# Patient Record
Sex: MALE | Race: OTHER | HISPANIC OR LATINO | Employment: FULL TIME | URBAN - METROPOLITAN AREA
[De-identification: names, ages, dates, MRNs, and addresses within clinical notes are randomized per-mention and may not be internally consistent; named-entity substitution may affect disease eponyms.]

---

## 2018-07-01 ENCOUNTER — HOSPITAL ENCOUNTER (EMERGENCY)
Facility: HOSPITAL | Age: 33
Discharge: HOME | End: 2018-07-01
Attending: EMERGENCY MEDICINE | Admitting: EMERGENCY MEDICINE
Payer: COMMERCIAL

## 2018-07-01 VITALS
OXYGEN SATURATION: 97 % | WEIGHT: 160 LBS | DIASTOLIC BLOOD PRESSURE: 71 MMHG | TEMPERATURE: 97.8 F | HEART RATE: 89 BPM | RESPIRATION RATE: 16 BRPM | SYSTOLIC BLOOD PRESSURE: 119 MMHG

## 2018-07-01 DIAGNOSIS — S16.1XXA STRAIN OF NECK MUSCLE, INITIAL ENCOUNTER: Primary | ICD-10-CM

## 2018-07-01 DIAGNOSIS — S39.012A STRAIN OF LUMBAR PARASPINOUS MUSCLE, INITIAL ENCOUNTER: ICD-10-CM

## 2018-07-01 PROCEDURE — 99283 EMERGENCY DEPT VISIT LOW MDM: CPT

## 2018-07-01 RX ORDER — BACLOFEN 10 MG/1
10 TABLET ORAL 3 TIMES DAILY
Qty: 15 TABLET | Refills: 0 | Status: SHIPPED | OUTPATIENT
Start: 2018-07-01 | End: 2019-04-19

## 2018-07-01 RX ORDER — ACETAMINOPHEN 325 MG/1
650 TABLET ORAL ONCE
Status: COMPLETED | OUTPATIENT
Start: 2018-07-01 | End: 2018-07-01

## 2018-07-01 RX ORDER — IBUPROFEN 600 MG/1
600 TABLET ORAL ONCE
Status: COMPLETED | OUTPATIENT
Start: 2018-07-01 | End: 2018-07-01

## 2018-07-01 RX ADMIN — ACETAMINOPHEN 650 MG: 325 TABLET, FILM COATED ORAL at 17:17

## 2018-07-01 RX ADMIN — IBUPROFEN 600 MG: 600 TABLET ORAL at 17:17
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
## 2018-07-01 NOTE — DISCHARGE INSTRUCTIONS
Distensión cervical   LO QUE NECESITA SABER:   Leonard distensión cervical es un estiramiento o desgarro de un músculo o ligamento del mojgan  Los tendones son los tejidos ramona que [place] a los músculos con los [place]  Las causas comunes de las distensiones cervicales incluyen un accidente automovilístico, leonard caída o leonard lesión deportiva  INSTRUCCIONES SOBRE EL LANI HOSPITALARIA:   Regrese a la jonnathan de emergencias si:   · Usted tiene dolor o entumecimiento de roberto hombros a catherine mano  · Usted tiene problemas de visión, audición o equilibrio  · Usted se siente confundido o no puede concentrarse  · Tiene problemas con el movimiento y fuerza  Pregúntele a catherine Parvin Clayman vitaminas y minerales son adecuados para usted  · Usted tiene más inflamación o dolor en catherine mojgan  · Usted tiene preguntas o inquietudes acerca de catherine condición o cuidado  Medicamentos:  Es posible que usted necesite alguno de los siguientes:  · Acetaminofeno:  parag el dolor y baja la fiebre  Está disponible sin receta médica  Pregunte la cantidad y la frecuencia con que debe tomarlos  [address]  Beth las etiquetas de todos los demás medicamentos que esté usando para saber si también contienen acetaminofén, o pregunte a catherine médico o farmacéutico  El acetaminofén puede causar daño en el hígado cuando no se abbey de forma correcta  No use más de 4 gramos (4000 miligramos) en total de acetaminofeno en un día  · AINEs (Analgésicos antiinflamatorios no esteroides) oh el ibuprofeno, ayudan a disminuir la inflamación, el dolor y la [place]  Alistair medicamento esta disponible con o sin leonard receta médica  Los AINEs pueden causar sangrado estomacal o problemas renales en ciertas personas  Si usted abbey un medicamento anticoagulante, siempre pregúntele a catherine médico si los SOFÍA son seguros para usted  Siempre beth la etiqueta de alistair medicamento y [place] instrucciones      · Relajantes musculares  ayudan a reducir dolor y espasmos musculares  · Un medicamento con receta para el dolor  podrían ser Gerhardt Estefania  Pregunte al médico cómo debe eduardo alistair medicamento de forma reynolds  Algunos medicamentos recetados para el dolor contienen acetaminofén  No tome otros medicamentos que contengan acetaminofén sin consultarlo con catherine médico  Demasiado acetaminofeno puede causar daño al hígado  Los medicamentos recetados para el dolor podrían causar estreñimiento  Pregunte a catherine médico oh prevenir o tratar estreñimiento  · [place] roberto medicamentos oh se le haya indicado  Consulte con catherine médico si usted elías que catherine medicamento no le está ayudando o si presenta efectos secundarios  Infórmele si es alérgico a cualquier medicamento  Mantenga leonard lista actualizada de los [place], las vitaminas y los productos herbales que abbey  Incluya los siguientes datos de los medicamentos: cantidad, frecuencia y motivo de administración  Traiga con usted la lista o los envases de la píldoras a roberto citas de seguimiento  Lleve la lista de los medicamentos con usted en [place] de leonard emergencia  El manejo de catherine síntomas:   · La aplicación de calor  en el mojgan chyna 15 a 20 minutos, 4 a 6 veces por día o según lo indicado  El calor ayuda a disminuir el dolor, la rigidez y los espasmos musculares  · Comience con ejercicios suaves para catherine mojgan  tan pronto oh usted pueda  catherine mojgan sin sentir dolor  Los ejercicios le ayudarán a disminuir la rigidez y a mejorar la fuerza y el rango de movimiento de catherine mojgan  Pregunte a catherine médico qué tipo de ejercicios debe hacer  · Regrese a roberto actividades usuales oh se le indique  Suspéndalas si siente dolor  Evite las [facility] [place] pueden provocar mas daño al mojgan, oh levantar objetos pesados o realizar ejercicio extenuante o de antonio intensidad  · Duerma sin almohada  para ayudar a reducir el dolor  En catherine lugar, enrolle leonard toalla pequeña guilherme apretada y colóquela bajo catherine mojgan       · Roylene Boy a terapia física según indicaciones  Un fisioterapeuta le puede enseñar ejercicios para ayudarle a mejorar el movimiento y la fuerza, y para disminuir el dolor  Evite leonard lesión en el mojgan:   · Conduzca con seguridad  Asegúrese que todos en el joaquín usan el cinturón de seguridad  Un cinturón de seguridad puede salvar brush shannan en philippe de un accidente automovilístico  No use el celular cuando esté manejando  [place] puede hacer que se distraiga y causar un accidente  Es mejor que pare y se orille si necesita hacer leonard Jake Nam un texto  · Use un thelma, un chaleco salvavidas y unos implementos de protección  Siempre use un thelma al [employer] en bicicleta o motocicleta, esquiar o jugar deportes que podrían causar leonard lesión en la kamila  Use implementos de protección cuando practique deportes  Use un chaleco salvavidas cuando esté en un bote o practicando actividades acuáticas  Acuda a roberto consultas de control con brush médico según le indicaron  Puede ser Latesha Chambers a un ortopedista o a fisioterapia  Anote roberto preguntas para que se acuerde de hacerlas chyna roberto visitas  © 2017 [address]  Information is for End User's use only and may not be sold, redistributed or otherwise used for commercial purposes  All illustrations and images included in CareNotes® are the copyrighted property of A D A M , Inc  or Dom Cartagena  Esta información es sólo para uso en educación  Brush intención no es darle un consejo médico sobre enfermedades o tratamientos  Colsulte con brush Megan Palomo farmacéutico antes de seguir cualquier régimen médico para saber si es seguro y efectivo para usted  Distensión de la parte inferior de la espalda, cuidados ambulatorios   INFORMACIÓN GENERAL:   La distensión de la parte inferior de la espalda  es leonard lesión en los músculos o tendones de la parte inferior de brush espalda o la región lumbar  Los tendones son los tejidos ramona que conectan a los Fatmata Scherer inferior de la espalda sostiene la mayor parte de catherine peso corporal y facilita catherine habilidad de moverse, torcerse y de inclinarse  El desgarro lumbar por lo general es causado por actividades que aumentan la tensión en la parte inferior de la espalda oh el ejercicio o leonard lesión  Los siguientes son los síntomas más comunes:   · Dolor en la parte inferior de la espalda o espasmos musculares    · Rigidez o movimiento limitado    · Dolor que se desplaza hacia los glúteos, yamileth o las piernas  · Dolor que empeora con la actividad  Busque atención médica inmediata al presentar los siguientes síntomas:   · Un chasquido en la región lumbar    · Aumento del dolor o la inflamación en la región lumbar    · Dificultad para  roberto piernas    · Entumecimiento en roberto piernas  El tratamiento para la distensión lumbar:   · Los antiiflamatorios no esteroideos (AINEs) ayudan a reducir inflamación y dolor o fiebre  [place] medicamento esta disponible con o sin leonard receta médica  Los AINEs podrían causar sangrado estomacal o problemas en los riñones en [place]  Si usted esta tomando un anticoágulante, siempre  pregunte a catherine proveedor de olya si los AINEs son seguros para usted  Antes de [employer] [place], new siempre la etiqueta de información y siga roberto indicaciones  · Los relajantes musculares  ayudan a disminuir el dolor de los espasmos musculares  · Le podrían recetar medicamento para el dolor  Pregunte cuál es la forma reynolds para eduardo el medicamento  Lidiar con roberto síntomas:   · Guarde reposo  en cama después de catherine Judieth Raven  Poco a poco empiece a incrementar catherine actividad física a medida que el dolor disminuya o según le recomendaron  · Aplique hielo  en la parte inferior de catherine espalda por 15 a 20 minutos cada hora o oh sea indicado  Use leonard compresa de hielo o coloque hielo tony en leonard bolsa de plástico y cúbrala con leonard toalla   El hielo ayuda a prevenir daño al tejido y [employer] inflamación y el dolor  Usted puede alternar entre el hielo y calor  · Aplique leonard compresa caliente  [employer] parte inferior de catherine espalda por 20 a 30 minutos cada 2 horas por los días que le recomendaron  El calor ayuda a disminuir el dolor y los espasmos musculares  Prevenir otra distensión lumbar:   · Use movimiento corporal correcto:      ¨ Cuando levante objetos pesados, debe inclinarse por la cadera y doblar las rodillas  No se incline o doble por la cintura  Utilice los [employer] de las piernas mientras levanta la carga  No use catherine espalda  Mantenga el objeto cerca de catherine pecho mientras lo levanta  No se tuerza, ni levante cualquier cosa por encima de catherine cintura  ¨ Cambie catherine posición con frecuencia cuando pase mucho tiempo de pie  Descanse un pie sobre leonard Yuly Alderman o un reposapiés alterne el reposo con el otro pie frecuentemente  ¨ No permanezca sentado por lapsos de tiempo prolongados  Cuando es necesario [place], siéntese en leonard silla de respaldo recto con los pies apoyados en el suelo  Nunca trate de alcanzar, jalar o empujar mientras esté sentado  · Ejercicio de forma regular  Realice ejercicios de calentamiento antes de practicar cualquier ejercicio o deporte  Quyen ejercicios para fortalecer roberto músculos de la espalda  Pregúntele acerca del plan de ejercicio más adecuado para usted  · Mantenga un peso saludable  Pregúntele a catherine proveedor de olya cuál es [address] ideal para usted  Solicite que le ayude a crear un plan para adelgazar si usted tiene sobrepeso  Programe leonard luis a con catherine proveedor de [employer] se le haya indicado: Anote roberto preguntas para que se acuerde de hacerlas chyna roberto visitas  ACUERDOS SOBRE CATHERINE CUIDADO:   Usted tiene el derecho de participar en la planificación de catherine cuidado  Aprenda todo lo que pueda sobre catherine condición y oh darle tratamiento  Discuta con roberto médicos roberto opciones de tratamiento para juntos decidir el cuidado que ted quiere recibir   Usted siempre tiene Venkatesh Heart a rechazar catherine tratamiento  Esta información es sólo para uso en educación  Catherine intención no es darle un consejo médico sobre enfermedades o tratamientos  Colsulte con catherine Carollee Lovings farmacéutico antes de seguir cualquier régimen médico para saber si es seguro y efectivo para usted  © 2014 [address] is for End User's use only and may not be sold, redistributed or otherwise used for commercial purposes  All illustrations and images included in CareNotes® are the copyrighted property of A D A SYLVIA , Inc  or Dom Cartagena

## 2018-09-13 ENCOUNTER — APPOINTMENT (OUTPATIENT)
Dept: RADIOLOGY | Facility: CLINIC | Age: 33
End: 2018-09-13
Payer: COMMERCIAL

## 2018-09-13 VITALS
HEART RATE: 73 BPM | WEIGHT: 153.8 LBS | HEIGHT: 74 IN | DIASTOLIC BLOOD PRESSURE: 80 MMHG | SYSTOLIC BLOOD PRESSURE: 113 MMHG | BODY MASS INDEX: 19.74 KG/M2

## 2018-09-13 DIAGNOSIS — M41.35 THORACOGENIC SCOLIOSIS OF THORACOLUMBAR REGION: ICD-10-CM

## 2018-09-13 DIAGNOSIS — G89.29 CHRONIC NECK PAIN: ICD-10-CM

## 2018-09-13 DIAGNOSIS — G89.29 CHRONIC MIDLINE LOW BACK PAIN WITHOUT SCIATICA: Primary | ICD-10-CM

## 2018-09-13 DIAGNOSIS — M54.50 CHRONIC MIDLINE LOW BACK PAIN WITHOUT SCIATICA: Primary | ICD-10-CM

## 2018-09-13 DIAGNOSIS — M54.5 LOW BACK PAIN, UNSPECIFIED BACK PAIN LATERALITY, UNSPECIFIED CHRONICITY, WITH SCIATICA PRESENCE UNSPECIFIED: ICD-10-CM

## 2018-09-13 DIAGNOSIS — M54.2 CHRONIC NECK PAIN: ICD-10-CM

## 2018-09-13 PROCEDURE — 72082 X-RAY EXAM ENTIRE SPI 2/3 VW: CPT

## 2018-09-13 PROCEDURE — 99203 OFFICE O/P NEW LOW 30 MIN: CPT | Performed by: EMERGENCY MEDICINE

## 2018-09-13 RX ORDER — PREDNISONE 20 MG/1
TABLET ORAL
Qty: 18 TABLET | Refills: 0 | Status: SHIPPED | OUTPATIENT
Start: 2018-09-13 | End: 2019-04-19

## 2018-09-13 NOTE — PATIENT INSTRUCTIONS
While taking oral steroids (Prednisone, Medrol dose pack) do not take any NSAIDs such as Advil, ibuprofen, Motrin, Aleve or naproxen  You can restart the NSAIDs after you finish the steroids  While taking oral steroids, you may experience mild side effects such as feeling jittery or flushing  Please call if your side effects are significant or you have any questions
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
## 2018-09-17 NOTE — TELEPHONE ENCOUNTER
Anthony Hargrove  [phone]    Dr Amena Mitchell    Patient request Rx faxed to Manda Sommers on file     [phone]    predniSONE 20 mg tablet [[id number]]   Order Details   Dose, Route, Frequency: As Directed    Dispense Quantity:  18 tablet Refills:  0 Fills remaining:  --           Si tab PO TID x 3 days, then 1 tab PO BID x 3 days, then 1 tab PO QD x 3 days          Written Date:  18 Expiration Date:  19     Start Date:  18 End Date:  --            Ordering Provider:  -- CHUCK #:  -- NPI:  --    Authorizing Provider:  Annamarie Chapman MD CHUCK #:  [id number] NPI:  [id number]    Ordering User:  Annamarie Chapman MD            Diagnosis Association: Chronic midline low back pain without sciatica (M54 5 , G89 29)      Pharmacy Comments:  --

## 2018-10-25 VITALS
DIASTOLIC BLOOD PRESSURE: 69 MMHG | HEIGHT: 74 IN | WEIGHT: 154.6 LBS | SYSTOLIC BLOOD PRESSURE: 101 MMHG | HEART RATE: 80 BPM | BODY MASS INDEX: 19.84 KG/M2

## 2018-10-25 DIAGNOSIS — M54.50 CHRONIC MIDLINE LOW BACK PAIN WITHOUT SCIATICA: Primary | ICD-10-CM

## 2018-10-25 DIAGNOSIS — G89.29 CHRONIC MIDLINE LOW BACK PAIN WITHOUT SCIATICA: Primary | ICD-10-CM

## 2018-10-25 DIAGNOSIS — M41.35 THORACOGENIC SCOLIOSIS OF THORACOLUMBAR REGION: ICD-10-CM

## 2018-10-25 PROCEDURE — 99213 OFFICE O/P EST LOW 20 MIN: CPT | Performed by: EMERGENCY MEDICINE

## 2018-10-25 NOTE — PATIENT INSTRUCTIONS
Motrin or Advil or Ibuprofen 400-600mg every 6 hours as needed for pain and inflammation  OR Aleve 250-500mg every 12 hours  Ice and heat  Over the counter lidocaine patches
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
## 2019-01-22 ENCOUNTER — HOSPITAL ENCOUNTER (EMERGENCY)
Facility: HOSPITAL | Age: 34
Discharge: HOME | End: 2019-01-22
Attending: EMERGENCY MEDICINE | Admitting: EMERGENCY MEDICINE
Payer: COMMERCIAL

## 2019-01-22 ENCOUNTER — APPOINTMENT (EMERGENCY)
Dept: RADIOLOGY | Facility: HOSPITAL | Age: 34
End: 2019-01-22
Payer: COMMERCIAL

## 2019-01-22 VITALS
HEART RATE: 94 BPM | RESPIRATION RATE: 18 BRPM | WEIGHT: 155 LBS | DIASTOLIC BLOOD PRESSURE: 65 MMHG | TEMPERATURE: 97.3 F | BODY MASS INDEX: 19.9 KG/M2 | SYSTOLIC BLOOD PRESSURE: 117 MMHG | OXYGEN SATURATION: 99 %

## 2019-01-22 DIAGNOSIS — M94.0 COSTOCHONDRITIS: Primary | ICD-10-CM

## 2019-01-22 LAB
BACTERIA UR QL AUTO: ABNORMAL /HPF
BILIRUB UR QL STRIP: NEGATIVE
CLARITY UR: CLEAR
COLOR UR: YELLOW
GLUCOSE UR STRIP-MCNC: NEGATIVE MG/DL
HGB UR QL STRIP.AUTO: NEGATIVE
KETONES UR STRIP-MCNC: NEGATIVE MG/DL
LEUKOCYTE ESTERASE UR QL STRIP: NEGATIVE
NITRITE UR QL STRIP: NEGATIVE
NON-SQ EPI CELLS URNS QL MICRO: ABNORMAL /HPF
PH UR STRIP.AUTO: 7.5 [PH] (ref 4.5–8)
PROT UR STRIP-MCNC: ABNORMAL MG/DL
RBC #/AREA URNS AUTO: ABNORMAL /HPF
SP GR UR STRIP.AUTO: 1.01 (ref 1–1.03)
UROBILINOGEN UR QL STRIP.AUTO: 1 E.U./DL
WBC #/AREA URNS AUTO: ABNORMAL /HPF

## 2019-01-22 PROCEDURE — 71101 X-RAY EXAM UNILAT RIBS/CHEST: CPT

## 2019-01-22 PROCEDURE — 93005 ELECTROCARDIOGRAM TRACING: CPT

## 2019-01-22 PROCEDURE — 99284 EMERGENCY DEPT VISIT MOD MDM: CPT

## 2019-01-22 PROCEDURE — 81001 URINALYSIS AUTO W/SCOPE: CPT

## 2019-01-22 RX ORDER — NAPROXEN 500 MG/1
500 TABLET ORAL 2 TIMES DAILY WITH MEALS
Qty: 10 TABLET | Refills: 0 | Status: SHIPPED | OUTPATIENT
Start: 2019-01-22 | End: 2019-04-19

## 2019-01-24 LAB
ATRIAL RATE: 72 BPM
P AXIS: 74 DEGREES
PR INTERVAL: 156 MS
QRS AXIS: 80 DEGREES
QRSD INTERVAL: 98 MS
QT INTERVAL: 348 MS
QTC INTERVAL: 381 MS
T WAVE AXIS: 76 DEGREES
VENTRICULAR RATE: 72 BPM

## 2019-01-24 PROCEDURE — 93010 ELECTROCARDIOGRAM REPORT: CPT | Performed by: INTERNAL MEDICINE

## 2019-01-24 NOTE — ED PROVIDER NOTES
History  Chief Complaint   Patient presents with    Rib Pain     Pt states "pain on my left rib for 3-4 weeks"     69-year-old male with no significant past history presents for evaluation of left-sided rib pain for the past 4 weeks  Patient reports he feels a pressure-like sensation as well as pain with movement  Patient states that he does not recall any trauma, falls area  States he has not been taking anything for this  Patient reports that with positional movements a gets worse  Describes the pain as sharp and nonradiating  Patient is not a smoker  Denies fever, chills, centralized chest pain, difficulty breathing, shortness breath, nausea, vomiting  No history of PE or DVT in the past              Prior to Admission Medications   Prescriptions Last Dose Informant Patient Reported? Taking?   baclofen 10 mg tablet   No No   Sig: Take 1 tablet (10 mg total) by mouth 3 (three) times a day   ibuprofen (MOTRIN) 100 mg/5 mL suspension   Yes No   Sig: Take 200 mg by mouth every 4 (four) hours as needed for mild pain   predniSONE 20 mg tablet   No No   Si tab PO TID x 3 days, then 1 tab PO BID x 3 days, then 1 tab PO QD x 3 days      Facility-Administered Medications: None       History reviewed  No pertinent past medical history  Past Surgical History:   Procedure Laterality Date    ELBOW SURGERY      TONSILECTOMY AND ADNOIDECTOMY      VASECTOMY  2016       Family History   Problem Relation Age of Onset    No Known Problems Mother     Cancer Father      I have reviewed and agree with the history as documented  Social History   Substance Use Topics    Smoking status: Never Smoker    Smokeless tobacco: Never Used    Alcohol use Yes      Comment: social        Review of Systems   Constitutional: Negative for chills and fever  HENT: Negative for congestion and sore throat  Respiratory: Negative for cough, chest tightness and shortness of breath      Cardiovascular: Positive for chest pain (reproducible)  Negative for palpitations and leg swelling  Gastrointestinal: Negative for nausea and vomiting  Musculoskeletal: Positive for myalgias  Negative for arthralgias, back pain and neck pain  Skin: Negative for color change and wound  Neurological: Negative for weakness and numbness  Physical Exam  Physical Exam   Constitutional: He appears well-developed and well-nourished  No distress  HENT:   Head: Normocephalic and atraumatic  Cardiovascular: Normal rate, normal heart sounds and intact distal pulses  Pulmonary/Chest: Effort normal and breath sounds normal  No respiratory distress  He has no wheezes  He has no rales  He exhibits tenderness  He exhibits no edema, no swelling and no retraction  ttp as well as with movement  No swelling, bruising, deformity, or erythema noted  Abdominal: Soft  Normal appearance and bowel sounds are normal  There is no tenderness  There is no guarding and no CVA tenderness  Musculoskeletal: Normal range of motion  He exhibits tenderness  He exhibits no edema or deformity  Skin: Skin is warm  Capillary refill takes less than 2 seconds  No rash noted  He is not diaphoretic  No erythema  Psychiatric: He has a normal mood and affect  Vitals reviewed        Vital Signs  ED Triage Vitals [01/22/19 2113]   Temperature Pulse Respirations Blood Pressure SpO2   (!) 97 3 °F (36 3 °C) 94 18 117/65 99 %      Temp src Heart Rate Source Patient Position - Orthostatic VS BP Location FiO2 (%)   -- -- -- -- --      Pain Score       Worst Possible Pain           Vitals:    01/22/19 2113   BP: 117/65   Pulse: 94       Visual Acuity      ED Medications  Medications - No data to display    Diagnostic Studies  Results Reviewed     Procedure Component Value Units Date/Time    Urine Microscopic [[id number]]  (Abnormal) Collected:  01/22/19 2253    Lab Status:  Final result Specimen:  Urine from Urine, Other Updated:  01/22/19 0477     RBC, UA 0-1 (A) /hpf WBC, UA 0-1 (A) /hpf      Epithelial Cells Occasional /hpf      Bacteria, UA Occasional /hpf     ED Urine Macroscopic [[id number]]  (Abnormal) Collected:  01/22/19 2253    Lab Status:  Final result Specimen:  Urine Updated:  01/22/19 2235     Color, UA Yellow     Clarity, UA Clear     pH, UA 7 5     Leukocytes, UA Negative     Nitrite, UA Negative     Protein, UA Trace (A) mg/dl      Glucose, UA Negative mg/dl      Ketones, UA Negative mg/dl      Urobilinogen, UA 1 0 E U /dl      Bilirubin, UA Negative     Blood, UA Negative     Specific Gravity, UA 1 015    Narrative:       CLINITEK RESULT                 XR ribs with pa chest min 3 views LEFT   ED Interpretation by Tyler Hare PA-C (01/22 2209)   Interpreted by me  No infiltrate, nl cardiac silhouette, no effusions    No fractures noted       Final Result by Gennaro Raymond MD (01/23 8982)      No active cardiopulmonary disease  No evidence of rib fractures           Workstation performed: [id number]                    Procedures  Procedures       Phone Contacts  ED Phone Contact    ED Course               PERC Rule for PE      Most Recent Value   PERC Rule for PE   Age >=50  0 Filed at: 01/22/2019 2245   HR >=100  0 Filed at: 01/22/2019 2245   O2 Sat on room air < 95%  0 Filed at: 01/22/2019 2245   History of PE or DVT  0 Filed at: 01/22/2019 2245   Recent trauma or surgery  0 Filed at: 01/22/2019 2245   Hemoptysis  0 Filed at: 01/22/2019 2245   Exogenous estrogen  0 Filed at: 01/22/2019 2245   Unilateral leg swelling  0 Filed at: 01/22/2019 2245   [address]  Rule for PE Results  0 Filed at: 01/22/2019 2245                Wells' Criteria for PE      Most Recent Value   Wells' Criteria for PE   Clinical signs and symptoms of DVT  0 Filed at: 01/22/2019 2245   PE is primary diagnosis or equally likely  0 Filed at: 01/22/2019 2245   HR >100  0 Filed at: 01/22/2019 2245   Immobilization at least 3 days or Surgery in the previous 4 weeks  0 Filed at: 01/22/2019 2245 Previous, objectively diagnosed PE or DVT  0 Filed at: 01/22/2019 2245   Hemoptysis  0 Filed at: 01/22/2019 2245   Malignancy with treatment within 6 months or palliative  0 Filed at: 01/22/2019 2245   Wells' Criteria Total  0 Filed at: 01/22/2019 2245            [facility]  Number of Diagnoses or Management Options  Costochondritis:     CritCare Time    Disposition  Final diagnoses:   Costochondritis     Time reflects when diagnosis was documented in both MDM as applicable and the Disposition within this note     Time User Action Codes Description Comment    1/22/2019 11:02 PM Becky Centeno Add [M94 0] Costochondritis       ED Disposition     ED Disposition Condition Comment    Discharge  [address] discharge to home/self care  Condition at discharge: Stable        Follow-up Information     Follow up With Specialties Details Why Contact Info    Edgardo Cooper MD Emergency Medicine Schedule an appointment as soon as possible for a visit in 1 week Follow up for re-check of symptoms [address] [facility]  [phone]            Discharge Medication List as of 1/22/2019 11:02 PM      CONTINUE these medications which have NOT CHANGED    Details   baclofen 10 mg tablet Take 1 tablet (10 mg total) by mouth 3 (three) times a day, Starting Sun 7/1/2018, Print      ibuprofen (MOTRIN) 100 mg/5 mL suspension Take 200 mg by mouth every 4 (four) hours as needed for mild pain, Historical Med      predniSONE 20 mg tablet 1 tab PO TID x 3 days, then 1 tab PO BID x 3 days, then 1 tab PO QD x 3 days, Print           No discharge procedures on file      ED Provider  Electronically Signed by           Yanet Aparicio PA-C  01/24/19 7328

## 2019-04-19 ENCOUNTER — APPOINTMENT (EMERGENCY)
Dept: RADIOLOGY | Facility: HOSPITAL | Age: 34
End: 2019-04-19
Payer: COMMERCIAL

## 2019-04-19 ENCOUNTER — HOSPITAL ENCOUNTER (EMERGENCY)
Facility: HOSPITAL | Age: 34
Discharge: HOME | End: 2019-04-19
Admitting: EMERGENCY MEDICINE
Payer: COMMERCIAL

## 2019-04-19 VITALS
DIASTOLIC BLOOD PRESSURE: 78 MMHG | OXYGEN SATURATION: 99 % | SYSTOLIC BLOOD PRESSURE: 120 MMHG | RESPIRATION RATE: 16 BRPM | HEART RATE: 89 BPM | TEMPERATURE: 98.3 F | WEIGHT: 157.41 LBS | BODY MASS INDEX: 20.21 KG/M2

## 2019-04-19 DIAGNOSIS — M62.838 TRAPEZIUS MUSCLE SPASM: Primary | ICD-10-CM

## 2019-04-19 PROCEDURE — 99283 EMERGENCY DEPT VISIT LOW MDM: CPT

## 2019-04-19 PROCEDURE — 99283 EMERGENCY DEPT VISIT LOW MDM: CPT | Performed by: PHYSICIAN ASSISTANT

## 2019-04-19 PROCEDURE — 73030 X-RAY EXAM OF SHOULDER: CPT

## 2019-04-19 RX ORDER — METHOCARBAMOL 500 MG/1
500 TABLET, FILM COATED ORAL
Qty: 15 TABLET | Refills: 0 | Status: SHIPPED | OUTPATIENT
Start: 2019-04-19 | End: 2020-09-10

## 2020-01-20 ENCOUNTER — OFFICE VISIT (OUTPATIENT)
Dept: URGENT CARE | Age: 35
End: 2020-01-20
Payer: COMMERCIAL

## 2020-01-20 VITALS
DIASTOLIC BLOOD PRESSURE: 79 MMHG | TEMPERATURE: 100.2 F | HEIGHT: 74 IN | SYSTOLIC BLOOD PRESSURE: 134 MMHG | OXYGEN SATURATION: 99 % | WEIGHT: 154 LBS | BODY MASS INDEX: 19.76 KG/M2 | RESPIRATION RATE: 18 BRPM

## 2020-01-20 DIAGNOSIS — J02.0 STREP PHARYNGITIS: Primary | ICD-10-CM

## 2020-01-20 LAB — S PYO AG THROAT QL: NEGATIVE

## 2020-01-20 PROCEDURE — G0382 LEV 3 HOSP TYPE B ED VISIT: HCPCS | Performed by: PHYSICIAN ASSISTANT

## 2020-01-20 PROCEDURE — 87880 STREP A ASSAY W/OPTIC: CPT | Performed by: PHYSICIAN ASSISTANT

## 2020-01-20 PROCEDURE — 87070 CULTURE OTHR SPECIMN AEROBIC: CPT | Performed by: PHYSICIAN ASSISTANT

## 2020-01-20 RX ORDER — AMOXICILLIN 500 MG/1
500 CAPSULE ORAL EVERY 8 HOURS SCHEDULED
Qty: 21 CAPSULE | Refills: 0 | Status: SHIPPED | OUTPATIENT
Start: 2020-01-20 | End: 2020-01-27

## 2020-01-20 RX ORDER — IBUPROFEN 400 MG/1
400 TABLET ORAL EVERY 6 HOURS PRN
Qty: 30 TABLET | Refills: 0 | Status: SHIPPED | OUTPATIENT
Start: 2020-01-20 | End: 2020-09-10

## 2020-01-20 NOTE — PROGRESS NOTES
[facility]        NAME: Kenton Romero is a 27 y o  male  : 1985    MRN: [id number]  DATE: 2020  TIME: 3:39 PM    Assessment and Plan   Strep pharyngitis [J02 0]  1  Strep pharyngitis  amoxicillin (AMOXIL) 500 mg capsule    ibuprofen (MOTRIN) 400 mg tablet    POCT rapid strepA         Patient Instructions       Continue to monitor symptoms  Drink plenty of fluids  Use over the counter Tylenol or Ibuprofen for fever and pain relief  If new or worsening symptoms develop, go immediately to the Er  Follow up with family doctor this week  Chief Complaint     Chief Complaint   Patient presents with    Sore Throat     x3 days, sore throat, bodyaches, b/l ear pain, and cough  pt did not get flu shot this year  pt taking theraflu and mucinex  History of Present Illness       Sore Throat    This is a new problem  Episode onset: 2 days ago  The problem has been gradually worsening  Neither side of throat is experiencing more pain than the other  The maximum temperature recorded prior to his arrival was 100 4 - 100 9 F  The fever has been present for 1 to 2 days  The pain is moderate  Associated symptoms include congestion, coughing, ear pain (Presure in R ear), a hoarse voice and swollen glands  Pertinent negatives include no abdominal pain, diarrhea, ear discharge, headaches, neck pain, shortness of breath, trouble swallowing or vomiting  He has had no exposure to strep or mono  He has tried nothing for the symptoms  The treatment provided no relief  Review of Systems   Review of Systems   Constitutional: Positive for chills and fever  Negative for diaphoresis and fatigue  HENT: Positive for congestion, ear pain (Presure in R ear), hoarse voice, postnasal drip, sinus pressure, sinus pain and sore throat  Negative for ear discharge, rhinorrhea, sneezing and trouble swallowing  Eyes: Negative  Respiratory: Positive for cough   Negative for chest tightness, shortness of breath and wheezing  Cardiovascular: Negative  Gastrointestinal: Negative for abdominal pain, diarrhea, nausea and vomiting  Endocrine: Negative  Genitourinary: Negative for dysuria  Musculoskeletal: Negative  Negative for neck pain  Skin: Negative for rash  Allergic/Immunologic: Negative  Neurological: Negative  Negative for light-headedness and headaches  Hematological: Negative  Psychiatric/Behavioral: Negative  Current Medications       Current Outpatient Medications:     amoxicillin (AMOXIL) 500 mg capsule, Take 1 capsule (500 mg total) by mouth every 8 (eight) hours for 7 days, Disp: 21 capsule, Rfl: 0    ibuprofen (MOTRIN) 400 mg tablet, Take 1 tablet (400 mg total) by mouth every 6 (six) hours as needed for mild pain or fever, Disp: 30 tablet, Rfl: 0    methocarbamol (ROBAXIN) 500 mg tablet, Take 1 tablet (500 mg total) by mouth daily at bedtime as needed for muscle spasms (Patient not taking: Reported on 1/20/2020), Disp: 15 tablet, Rfl: 0    Current Allergies     Allergies as of 01/20/2020    (No Known Allergies)            The following portions of the patient's history were reviewed and updated as appropriate: allergies, current medications, past family history, past medical history, past social history, past surgical history and problem list      History reviewed  No pertinent past medical history  Past Surgical History:   Procedure Laterality Date    ELBOW SURGERY      TONSILECTOMY AND ADNOIDECTOMY      VASECTOMY  2016       Family History   Problem Relation Age of Onset    No Known Problems Mother     Cancer Father          Medications have been verified          Objective   /79 (BP Location: Left arm, Patient Position: Sitting, Cuff Size: Adult)   Temp 100 2 °F (37 9 °C) (Tympanic)   Resp 18   Ht 6' 2" (1 88 m)   Wt 69 9 kg (154 lb)   SpO2 99%   BMI 19 77 kg/m²        Physical Exam     Physical Exam   Constitutional: He appears well-developed and well-nourished  No distress  HENT:   Head: Normocephalic and atraumatic  Right Ear: Hearing, tympanic membrane, external ear and ear canal normal    Left Ear: Hearing, tympanic membrane, external ear and ear canal normal    Nose: Mucosal edema present  Right sinus exhibits no maxillary sinus tenderness and no frontal sinus tenderness  Left sinus exhibits no maxillary sinus tenderness and no frontal sinus tenderness  Mouth/Throat: Posterior oropharyngeal erythema present  No oropharyngeal exudate or posterior oropharyngeal edema  Eyes: Conjunctivae are normal  Right eye exhibits no discharge  Left eye exhibits no discharge  Neck: Normal range of motion  Neck supple  Cardiovascular: Normal rate, regular rhythm, normal heart sounds and intact distal pulses  Pulmonary/Chest: Effort normal and breath sounds normal  No respiratory distress  He has no wheezes  He has no rales  Lymphadenopathy:     He has cervical adenopathy (mild b/l)  Skin: Skin is warm  Capillary refill takes less than 2 seconds  No rash noted  He is not diaphoretic  No pallor  Nursing note and vitals reviewed

## 2020-01-20 NOTE — LETTER
January 20, 2020     Patient: Jennifer Ac   YOB: 1985   Date of Visit: 1/20/2020       To Whom It May Concern: It is my medical opinion that Jennifer Ac may return to work on 1/22/2019 as long as patient does not have fever  If you have any questions or concerns, please don't hesitate to call           Sincerely,        Kimberlee Del Castillo PA-C    CC: No Recipients

## 2020-01-20 NOTE — PATIENT INSTRUCTIONS
Continue to monitor symptoms  Drink plenty of fluids  Use over the counter Tylenol or Ibuprofen for fever and pain relief  If new or worsening symptoms develop, go immediately to the Er  Follow up with family doctor this week  Strep Throat   WHAT YOU NEED TO KNOW:   Strep throat is a throat infection caused by bacteria  It is easily spread from person to person  DISCHARGE INSTRUCTIONS:   Call 911 for any of the following:   · You have trouble breathing  Return to the emergency department if:   · You have new symptoms like a bad headache, stiff neck, chest pain, or vomiting  · You are drooling because you cannot swallow your spit  Contact your healthcare provider if:   · You have a fever  · You have a rash or ear pain  · You have green, yellow-brown, or bloody mucus when you cough or blow your nose  · You are unable to drink anything  · You have questions or concerns about your condition or care  Medicines:   · Antibiotics  help treat your strep throat  You should feel better within 2 to 3 days after you start antibiotics  · Take your medicine as directed  Contact your healthcare provider if you think your medicine is not helping or if you have side effects  Tell him or her if you are allergic to any medicine  Keep a list of the medicines, vitamins, and herbs you take  Include the amounts, and when and why you take them  Bring the list or the pill bottles to follow-up visits  Carry your medicine list with you in case of an emergency  Manage your symptoms:   · Use lozenges, ice, soft foods, or popsicles  to soothe your throat  · Drink juice, milk shakes, or soup  if your throat is too sore to eat solid food  Drinking liquids can also help prevent dehydration  · Gargle with salt water  Mix ¼ teaspoon salt in a glass of warm water and gargle  This may help reduce swelling in your throat  · Do not smoke    Nicotine and other chemicals in cigarettes and cigars can cause lung damage and make your symptoms worse  Ask your healthcare provider for information if you currently smoke and need help to quit  E-cigarettes or smokeless tobacco still contain nicotine  Talk to your healthcare provider before you use these products  Return to work or school  24 hours after you start antibiotic medicine  Prevent the spread of strep throat:   · Wash your hands often  Use soap and water  Wash your hands after you use the bathroom, change a child's diapers, or sneeze  Wash your hands before you prepare or eat food  · Do not share food or drinks  Replace your toothbrush after you have taken antibiotics for 24 hours  Follow up with your healthcare provider as directed:  Write down your questions so you remember to ask them during your visits  © 2017 [facility] Information is for End User's use only and may not be sold, redistributed or otherwise used for commercial purposes  All illustrations and images included in CareNotes® are the copyrighted property of A D [employer] M [employer] Inc  or [employer]  The above information is an  only  It is not intended as medical advice for individual conditions or treatments  Talk to your doctor, nurse or pharmacist before following any medical regimen to see if it is safe and effective for you

## 2020-01-23 LAB — BACTERIA THROAT CULT: NORMAL

## 2020-05-03 ENCOUNTER — APPOINTMENT (EMERGENCY)
Dept: RADIOLOGY | Facility: HOSPITAL | Age: 35
End: 2020-05-03
Payer: OTHER MISCELLANEOUS

## 2020-05-03 ENCOUNTER — HOSPITAL ENCOUNTER (EMERGENCY)
Facility: HOSPITAL | Age: 35
Discharge: HOME | End: 2020-05-03
Attending: EMERGENCY MEDICINE | Admitting: EMERGENCY MEDICINE
Payer: OTHER MISCELLANEOUS

## 2020-05-03 VITALS
RESPIRATION RATE: 18 BRPM | DIASTOLIC BLOOD PRESSURE: 88 MMHG | TEMPERATURE: 98.7 F | OXYGEN SATURATION: 98 % | WEIGHT: 160 LBS | SYSTOLIC BLOOD PRESSURE: 171 MMHG | BODY MASS INDEX: 20.53 KG/M2 | HEIGHT: 74 IN | HEART RATE: 92 BPM

## 2020-05-03 DIAGNOSIS — M25.522 LEFT ELBOW PAIN: Primary | ICD-10-CM
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
## 2020-05-03 DIAGNOSIS — M54.9 BACK PAIN: ICD-10-CM

## 2020-05-03 DIAGNOSIS — M25.532 LEFT WRIST PAIN: ICD-10-CM

## 2020-05-03 PROCEDURE — 96372 THER/PROPH/DIAG INJ SC/IM: CPT

## 2020-05-03 PROCEDURE — 73080 X-RAY EXAM OF ELBOW: CPT

## 2020-05-03 PROCEDURE — 99283 EMERGENCY DEPT VISIT LOW MDM: CPT

## 2020-05-03 PROCEDURE — 73110 X-RAY EXAM OF WRIST: CPT

## 2020-05-03 PROCEDURE — 99284 EMERGENCY DEPT VISIT MOD MDM: CPT | Performed by: EMERGENCY MEDICINE

## 2020-05-03 RX ORDER — LIDOCAINE 50 MG/G
1 PATCH TOPICAL ONCE
Status: DISCONTINUED | OUTPATIENT
Start: 2020-05-03 | End: 2020-05-03 | Stop reason: HOSPADM

## 2020-05-03 RX ORDER — KETOROLAC TROMETHAMINE 30 MG/ML
15 INJECTION, SOLUTION INTRAMUSCULAR; INTRAVENOUS ONCE
Status: COMPLETED | OUTPATIENT
Start: 2020-05-03 | End: 2020-05-03

## 2020-05-03 RX ADMIN — KETOROLAC TROMETHAMINE 15 MG: 30 INJECTION, SOLUTION INTRAMUSCULAR at 04:51

## 2020-05-03 RX ADMIN — LIDOCAINE 1 PATCH: 50 PATCH TOPICAL at 04:51

## 2020-05-05 ENCOUNTER — APPOINTMENT (OUTPATIENT)
Dept: URGENT CARE | Age: 35
End: 2020-05-05
Payer: OTHER MISCELLANEOUS

## 2020-05-05 PROCEDURE — 99213 OFFICE O/P EST LOW 20 MIN: CPT | Performed by: PHYSICIAN ASSISTANT

## 2020-07-19 ENCOUNTER — OFFICE VISIT (OUTPATIENT)
Dept: URGENT CARE | Age: 35
End: 2020-07-19
Payer: COMMERCIAL

## 2020-07-19 ENCOUNTER — APPOINTMENT (OUTPATIENT)
Dept: RADIOLOGY | Age: 35
End: 2020-07-19
Payer: COMMERCIAL

## 2020-07-19 VITALS
WEIGHT: 160 LBS | OXYGEN SATURATION: 99 % | TEMPERATURE: 97.4 F | RESPIRATION RATE: 16 BRPM | BODY MASS INDEX: 20.54 KG/M2 | SYSTOLIC BLOOD PRESSURE: 110 MMHG | HEART RATE: 86 BPM | DIASTOLIC BLOOD PRESSURE: 67 MMHG

## 2020-07-19 DIAGNOSIS — M25.511 ACUTE PAIN OF RIGHT SHOULDER: ICD-10-CM

## 2020-07-19 DIAGNOSIS — S43.401A SPRAIN OF RIGHT SHOULDER, UNSPECIFIED SHOULDER SPRAIN TYPE, INITIAL ENCOUNTER: Primary | ICD-10-CM

## 2020-07-19 PROCEDURE — G0382 LEV 3 HOSP TYPE B ED VISIT: HCPCS | Performed by: PHYSICIAN ASSISTANT

## 2020-07-19 PROCEDURE — 73030 X-RAY EXAM OF SHOULDER: CPT

## 2020-07-19 RX ORDER — PREDNISONE 50 MG/1
50 TABLET ORAL DAILY
Qty: 5 TABLET | Refills: 0 | Status: SHIPPED | OUTPATIENT
Start: 2020-07-19 | End: 2020-07-24

## 2020-07-19 NOTE — PATIENT INSTRUCTIONS
Rest, Ice, and Elevate limb  Continue to monitor symptoms  If symptoms do not improve in one week, follow up with orthopedics  Call Deborah James [phone] to schedule and appointment  If new or worsening symptoms occur, go immediately to the ER  Shoulder Sprain   WHAT YOU NEED TO KNOW:   A shoulder sprain happens when a ligament in your shoulder is stretched or torn  Ligaments are the tough tissues that connect bones  Ligaments allow you to lift, lower, and rotate your arm  DISCHARGE INSTRUCTIONS:   Return to the emergency department if:   · You are short of breath  · Your throat feels tight, or you have trouble swallowing  · You feel sudden, sharp chest pain on the same side as your injury  · Your skin feels cold or clammy  Contact your healthcare provider if:   · The skin on your injured shoulder looks blue or pale  · You have new or increased swelling and pain in your shoulder  · You have new or increased stiffness when you move your injured shoulder  · You have questions or concerns about your condition or care  Medicines:   · Prescription pain medicine  may be given  Ask how to take this medicine safely  · Take your medicine as directed  Contact your healthcare provider if you think your medicine is not helping or if you have side effects  Tell him or her if you are allergic to any medicine  Keep a list of the medicines, vitamins, and herbs you take  Include the amounts, and when and why you take them  Bring the list or the pill bottles to follow-up visits  Carry your medicine list with you in case of an emergency  Follow up with your healthcare provider as directed:  Write down your questions so you remember to ask them during your visits  Self-care:   · Rest  your shoulder so it can heal  Avoid moving your shoulder as your injury heals  This will help decrease the risk of more damage to your shoulder      · Apply ice  on your shoulder for 15 to 20 minutes every hour or as directed  Use an ice pack, or put crushed ice in a plastic bag  Cover it with a towel  Ice helps prevent tissue damage and decreases swelling and pain  · Compress your shoulder as directed  Compression provides support and helps decrease swelling and movement so your shoulder can heal  For mild sprains, you may be given a sling to support your arm  You may need a padded brace or a plaster cast to hold your shoulder in place if the sprain is more serious  How to wear a brace, sling, or splint:  A brace, sling, or splint may be needed to limit your movement and protect your injured shoulder  · Wear your brace, sling, or splint as directed  You may need to wear it all the time and take it off only to bathe or do exercises  Ask your healthcare provider how long you should wear it  · Keep your skin clean and dry  Padding under your armpit will help absorb sweat and prevent sores on your skin  · Do not hunch your shoulders  This may cause pain  Keep your shoulders relaxed  · Position the sling over your arm and hand so that it also covers your knuckles  This will help the sling support your wrist and hand  Position your wrist higher than your elbow  Your wrist may start to hurt or go numb if your sling is too short  Exercise your shoulder:  After you rest your shoulder for 3 to 7 days, you will need to do light exercises to decrease shoulder stiffness  Check with your healthcare provider before you return to your normal activities or sports  Prevent another injury:  You can hurt your shoulder again if you stop treatment too soon  The following may decrease your risk for sprains:  · Do not exercise when you are tired or in pain  Warm up and stretch before you exercise  · Wear equipment to protect yourself when you play sports  · Wear shoes that fit well and run on flat surfaces to prevent falls    © 2017 [facility] Information is for End User's use only and may not be sold, redistributed or otherwise used for commercial purposes  All illustrations and images included in CareNotes® are the copyrighted property of [employer] D A M , Inc  or [employer]  The above information is an  only  It is not intended as medical advice for individual conditions or treatments  Talk to your doctor, nurse or pharmacist before following any medical regimen to see if it is safe and effective for you

## 2020-07-19 NOTE — PROGRESS NOTES
[facility]        NAME: Tamara Hood is a 27 y o  male  : 1985    MRN: [id number]  DATE: 2020  TIME: 11:20 AM    Assessment and Plan   Sprain of right shoulder, unspecified shoulder sprain type, initial encounter [S43 401A]  1  Sprain of right shoulder, unspecified shoulder sprain type, initial encounter  XR shoulder 2+ vw right    Comfort Arm Sling    Ambulatory referral to Orthopedic Surgery    predniSONE 50 mg tablet     Positive Yergason's test   Patient appears to be in significant pain  Patient educated on rice, use of steroids  Patient will be placed in sling and referred to Orthopedics due to concern for long head of biceps injury    Patient Instructions       Rest, Ice, and Elevate limb  Continue to monitor symptoms  If symptoms do not improve in one week, follow up with orthopedics  Call Josy High [phone] to schedule and appointment  If new or worsening symptoms occur, go immediately to the ER  Chief Complaint     Chief Complaint   Patient presents with    Shoulder Pain     Right shoulder pain, onset 1 week ago and has been worsening with work, 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)  Occasional sharp, radiating pain to dorsal hand, no numbness or tingling  Limited ROM and movement from right shoulder  History of Present Illness       Arm Pain    Incident onset: One week ago atraumatic slowly worsening R shoulder pain originating in anterior R deltoid area  The incident occurred at work  Injury mechanism: No specific injury  Pt does repeated heavy lifting at work  The quality of the pain is described as aching  Radiates to: shoots down into dorsum of hand  The pain is moderate  The pain has been constant since the incident  Pertinent negatives include no chest pain, numbness or tingling  Nothing aggravates the symptoms  He has tried nothing for the symptoms  The treatment provided no relief         Review of Systems Review of Systems   Constitutional: Negative  HENT: Negative  Eyes: Negative  Respiratory: Negative  Negative for chest tightness, shortness of breath and wheezing  Cardiovascular: Negative  Negative for chest pain and palpitations  Gastrointestinal: Negative  Endocrine: Negative  Genitourinary: Negative  Musculoskeletal: Negative for back pain, gait problem, joint swelling, neck pain and neck stiffness  Skin: Negative  Negative for color change, rash and wound  Neurological: Negative for dizziness, tingling, weakness, light-headedness, numbness and headaches  Hematological: Negative  Psychiatric/Behavioral: Negative            Current Medications       Current Outpatient Medications:     ibuprofen (MOTRIN) 400 mg tablet, Take 1 tablet (400 mg total) by mouth every 6 (six) hours as needed for mild pain or fever, Disp: 30 tablet, Rfl: 0    Doxylamine Succinate, Sleep, (UNISOM PO), Take by mouth, Disp: , Rfl:     MELATONIN PO, Take by mouth, Disp: , Rfl:     methocarbamol (ROBAXIN) 500 mg tablet, Take 1 tablet (500 mg total) by mouth daily at bedtime as needed for muscle spasms (Patient not taking: Reported on 1/20/2020), Disp: 15 tablet, Rfl: 0    predniSONE 50 mg tablet, Take 1 tablet (50 mg total) by mouth daily for 5 days, Disp: 5 tablet, Rfl: 0    Current Allergies     Allergies as of 07/19/2020    (No Known Allergies)            The following portions of the patient's history were reviewed and updated as appropriate: allergies, current medications, past family history, past medical history, past social history, past surgical history and problem list      Past Medical History:   Diagnosis Date    Tachycardia     as a child       Past Surgical History:   Procedure Laterality Date    ELBOW SURGERY      TONSILECTOMY AND ADNOIDECTOMY      VASECTOMY  2016    WISDOM TOOTH EXTRACTION         Family History   Problem Relation Age of Onset    No Known Problems Mother    Ramon Magallanes Cancer Father     Depression Sister     Fibromyalgia Sister     Mitral valve prolapse Sister     No Known Problems Son     No Known Problems Daughter          Medications have been verified  Objective   /67   Pulse 86   Temp (!) 97 4 °F (36 3 °C) (Tympanic)   Resp 16   Wt 72 6 kg (160 lb)   SpO2 99%   BMI 20 54 kg/m²        Physical Exam     Physical Exam   Constitutional: He appears well-developed and well-nourished  No distress  HENT:   Head: Normocephalic and atraumatic  Cardiovascular: Normal rate, regular rhythm, normal heart sounds and intact distal pulses  Pulmonary/Chest: Effort normal and breath sounds normal  No respiratory distress  He has no wheezes  He has no rales  Musculoskeletal:        Right shoulder: He exhibits decreased range of motion (Pain with any elevation greater than 90 degrees in any direction), tenderness (Anterior deltoid, trapeszius   (+)Yergason test), pain and decreased strength  He exhibits no bony tenderness, no swelling, no deformity and normal pulse  Sensation and circulation intact and symmetrical to UE b/l   Skin: Skin is warm  Capillary refill takes less than 2 seconds  No rash noted  He is not diaphoretic  Nursing note and vitals reviewed

## 2020-07-19 NOTE — LETTER
July 19, 2020     Patient: Tamara Hood   YOB: 1985   Date of Visit: 7/19/2020       To Whom It May Concern: It is my medical opinion that Tamara Hood may return to light duty immediately with the following restrictions: limited use of R arm until seen by orthopedics  If you have any questions or concerns, please don't hesitate to call           Sincerely,        ESAU HEWITT    CC: No Recipients

## 2020-07-23 ENCOUNTER — OFFICE VISIT (OUTPATIENT)
Dept: OBGYN CLINIC | Facility: MEDICAL CENTER | Age: 35
End: 2020-07-23
Payer: COMMERCIAL

## 2020-07-23 VITALS
HEART RATE: 92 BPM | BODY MASS INDEX: 20.53 KG/M2 | HEIGHT: 74 IN | WEIGHT: 160 LBS | TEMPERATURE: 97.6 F | DIASTOLIC BLOOD PRESSURE: 78 MMHG | SYSTOLIC BLOOD PRESSURE: 125 MMHG

## 2020-07-23 DIAGNOSIS — S43.431S LABRAL TEAR OF SHOULDER, RIGHT, SEQUELA: Primary | ICD-10-CM

## 2020-07-23 DIAGNOSIS — S43.401A SPRAIN OF RIGHT SHOULDER, UNSPECIFIED SHOULDER SPRAIN TYPE, INITIAL ENCOUNTER: ICD-10-CM

## 2020-07-23 PROCEDURE — 99213 OFFICE O/P EST LOW 20 MIN: CPT | Performed by: ORTHOPAEDIC SURGERY

## 2020-07-23 NOTE — LETTER
July 23, 2020     Patient: Nadia Phillips   YOB: 1985   Date of Visit: 7/23/2020       To Whom it May Concern:    Nadia Phillips is under my professional care  He was seen in my office on 7/23/2020  He should remain at light duty until his next follow up  He should not lift, push or pull with the right arm  If you have any questions or concerns, please don't hesitate to call  Sincerely,          Zoraida Fried DO        CC: Chau Chacko

## 2020-07-23 NOTE — PROGRESS NOTES
Ortho Sports Medicine Shoulder New Patient Visit     Assesment:     27 y o  male right shoulder possible labral tear    Plan:    Conservative treatment:    Ice to shoulder 1-2 times daily, for 20 minutes at a time  OTC as necessary for pain management    Imaging: All imaging from today was reviewed by myself and explained to the patient  We will obtain an MRI arthrogram    Injection:    No Injection planned at this time  Surgery:     No surgery is recommended at this point, continue with conservative treatment plan as noted  Follow up:    No follow-ups on file  Chief Complaint   Patient presents with    Right Shoulder - Pain     History of Present Illness: The patient is a 27 y o , right hand dominant male whose occupation is , referred to me by urgent care, seen in clinic for evaluation of right shoulder pain  The patient denies a history of diabetes  The patient denies a history of thyroid disorder  Pain is located anterior, posterior, lateral   The patient rates the pain as a 9/10  The pain has been present for 1 weeks  The patient did not recall sustaining any injury  The patient is states that this pain started about 1 week ago  The mechanism of injury was unknown  Patient states that he does a lot of heavy lifting overhead for his daily job  The pain is characterized as dull, achy  The pain is present at all times  Pain is improved by rest, ice, NSAIDS and bracing  Pain is aggravated by overhead activity, reaching back and lifting  Symptoms include clicking, catching and cracking  The patient denies weakness  The patient denies numbness and tingling  The patient has tried rest, ice, NSAIDS and bracing          Shoulder Surgical History:  None    Past Medical, Social and Family History:  Past Medical History:   Diagnosis Date    Tachycardia     as a child     Past Surgical History:   Procedure Laterality Date    ELBOW SURGERY      TONSILECTOMY AND ADNOIDECTOMY      VASECTOMY  2016    WISDOM TOOTH EXTRACTION       No Known Allergies  Current Outpatient Medications on File Prior to Visit   Medication Sig Dispense Refill    Doxylamine Succinate, Sleep, (UNISOM PO) Take by mouth      predniSONE 50 mg tablet Take 1 tablet (50 mg total) by mouth daily for 5 days 5 tablet 0    ibuprofen (MOTRIN) 400 mg tablet Take 1 tablet (400 mg total) by mouth every 6 (six) hours as needed for mild pain or fever (Patient not taking: Reported on 7/23/2020) 30 tablet 0    MELATONIN PO Take by mouth      methocarbamol (ROBAXIN) 500 mg tablet Take 1 tablet (500 mg total) by mouth daily at bedtime as needed for muscle spasms (Patient not taking: Reported on 7/23/2020) 15 tablet 0     No current facility-administered medications on file prior to visit        Social History     Socioeconomic History    Marital status: /Civil Union     Spouse name: Jeffrey Guerin Number of children: 2    Years of education: Not on file    Highest education level: Not on file   Occupational History    Occupation:    Social Needs    Financial resource strain: Not on file    Food insecurity:     Worry: Not on file     Inability: Not on file   [employer] needs:     Medical: Not on file     Non-medical: Not on file   Tobacco Use    Smoking status: Never Smoker    Smokeless tobacco: Never Used   Substance and Sexual Activity    Alcohol use: Yes     Comment: social    Drug use: Never    Sexual activity: Not on file   Lifestyle    Physical activity:     Days per week: 0 days     Minutes per session: 0 min    Stress: Not on file   Relationships    Social connections:     Talks on phone: Not on file     Gets together: Not on file     Attends Orthodoxy service: Not on file     Active member of club or organization: Not on file     Attends meetings of clubs or organizations: Not on file     Relationship status: Not on file    Intimate partner violence: Fear of current or ex partner: Not on file     Emotionally abused: Not on file     Physically abused: Not on file     Forced sexual activity: Not on file   Other Topics Concern    Not on file   Social History Narrative    Who lives in your home:wife and 2 children    What type of home do you live in: Single house    Age of your home: 18 months    How long have you been living there: 18 months    Type of heat: Forced hot air    Type of fuel: Gas and Electric    What type of ann is in your bedroom: Carpet    Do you have the following in or near your home:    Air products: Central air    Pests: none    Pets: 1 Cat    Are pets allowed in bedroom: Yes, rarely    Open fields, wooded areas nearby: N/A    Basement: None    Exposure to second hand smoke: Yes at work        Habits:    Caffeine: Current; Amount: 2 cups/day Red Bull, #years 12+    Chocolate: none    Other:     I have reviewed the past medical, surgical, social and family history, medications and allergies as documented in the EMR  Review of systems: ROS is negative other than that noted in the HPI  Constitutional: Negative for fatigue and fever  HENT: Negative for sore throat  Respiratory: Negative for shortness of breath  Cardiovascular: Negative for chest pain  Gastrointestinal: Negative for abdominal pain  Endocrine: Negative for cold intolerance and heat intolerance  Genitourinary: Negative for flank pain  Musculoskeletal: Negative for back pain  Skin: Negative for rash  Allergic/Immunologic: Negative for immunocompromised state  Neurological: Negative for dizziness  Psychiatric/Behavioral: Negative for agitation  Physical Exam:    Blood pressure 125/78, pulse 92, temperature 97 6 °F (36 4 °C), height 6' 2" (1 88 m), weight 72 6 kg (160 lb)      General/Constitutional: NAD, well developed, well nourished  HENT: Normocephalic, atraumatic  CV: Intact distal pulses, regular rate  Resp: No respiratory distress or labored breathing  Lymphatic: No lymphadenopathy palpated  Neuro: Alert and Oriented x 3, no focal deficits  Psych: Normal mood, normal affect, normal judgement, normal behavior  Skin: Warm, dry, no rashes, no erythema    Shoulder focused exam:    RIGHT LEFT    Scapula Atrophy Negative Negative     Winging Negative Negative     Protraction Negative Negative    Rotator cuff SS 5/5 with pain 5/5     IS 5/5 with pain 5/5     SubS 5/5 with pain 5/5    ROM FF 70 active 170 passive    170     ER0 60 60     ER90 90    90     IR90 T6    T6     IRb T6    T6    TTP: AC Negative Negative     Biceps Negative Negative     Coracoid Negative Negative    Special Tests: O'Briens Positive Negative     Ruggiero-shear Positive Negative     Cross body Adduction Negative Negative     Speeds  Negative Negative     Peter's Negative Negative     Whipple Negative Negative       Neer Negative Negative     Dixon Negative Negative    Instability: Apprehension & relocation not tested not tested     Load & shift not tested not tested    Other: Crank Negative Negative             UE NV Exam: +2 Radial pulses bilaterally  Sensation intact to light touch C5-T1 bilaterally, Radial/median/ulnar nerve motor intact    Bilateral elbow, wrist, and and forearm ROM full, painless with passive ROM, no ttp or crepitance throughout extremities below shoulder joint    Cervical ROM is full without pain, numbness or tingling    Shoulder Imaging    X-rays of the right shoulder were reviewed, which demonstrate no acute fractures or osseous abnormalities  I have reviewed the radiology report and agree with their impression      Scribe Attestation    I,:   Arnol Stahl am acting as a scribe while in the presence of the attending physician :        I,:   [facility], DO personally performed the services described in this documentation    as scribed in my presence :

## 2020-07-27 ENCOUNTER — TELEPHONE (OUTPATIENT)
Dept: OBGYN CLINIC | Facility: HOSPITAL | Age: 35
End: 2020-07-27

## 2020-07-27 NOTE — TELEPHONE ENCOUNTER
Patient wanted to know if rather than getting MRI right shoulder arthrogram, if we would be able to do a regular MRI right shoulder  Please advise    MRI scheduled for 07/29    Thank you!

## 2020-07-28 DIAGNOSIS — S43.431A SUPERIOR GLENOID LABRUM LESION OF RIGHT SHOULDER, INITIAL ENCOUNTER: Primary | ICD-10-CM

## 2020-07-28 NOTE — TELEPHONE ENCOUNTER
A regular MRI order has been placed  Please change the MRI scheduled and call the patient to make them aware that we have switched to a regular MRI

## 2020-07-28 NOTE — TELEPHONE ENCOUNTER
I have rescheduled patient, 8/4  I will call him with info later, he is at work now and very noisy, difficult to communicate

## 2020-07-29 ENCOUNTER — HOSPITAL ENCOUNTER (OUTPATIENT)
Dept: RADIOLOGY | Facility: HOSPITAL | Age: 35
Discharge: HOME | End: 2020-07-29

## 2020-08-04 ENCOUNTER — HOSPITAL ENCOUNTER (OUTPATIENT)
Dept: MRI IMAGING | Facility: HOSPITAL | Age: 35
Discharge: HOME | End: 2020-08-04
Payer: COMMERCIAL

## 2020-08-04 DIAGNOSIS — S43.431A SUPERIOR GLENOID LABRUM LESION OF RIGHT SHOULDER, INITIAL ENCOUNTER: ICD-10-CM

## 2020-08-04 PROCEDURE — 73221 MRI JOINT UPR EXTREM W/O DYE: CPT

## 2020-08-06 ENCOUNTER — OFFICE VISIT (OUTPATIENT)
Dept: OBGYN CLINIC | Facility: MEDICAL CENTER | Age: 35
End: 2020-08-06
Payer: COMMERCIAL

## 2020-08-06 VITALS
HEART RATE: 81 BPM | BODY MASS INDEX: 20.53 KG/M2 | HEIGHT: 74 IN | TEMPERATURE: 99.2 F | SYSTOLIC BLOOD PRESSURE: 104 MMHG | WEIGHT: 160 LBS | DIASTOLIC BLOOD PRESSURE: 71 MMHG

## 2020-08-06 DIAGNOSIS — M75.21 BICEPS TENDONITIS ON RIGHT: Primary | ICD-10-CM

## 2020-08-06 PROCEDURE — 99214 OFFICE O/P EST MOD 30 MIN: CPT | Performed by: ORTHOPAEDIC SURGERY

## 2020-08-06 RX ORDER — NAPROXEN 500 MG/1
500 TABLET ORAL 2 TIMES DAILY WITH MEALS
Qty: 28 TABLET | Refills: 0 | Status: SHIPPED | OUTPATIENT
Start: 2020-08-06 | End: 2021-08-09 | Stop reason: ALTCHOICE

## 2020-08-06 NOTE — PROGRESS NOTES
Ortho Sports Medicine Shoulder Follow Up Visit     Assesment:   27 y o  male right shoulder bicep tendonitis    Plan:    Conservative treatment:    Ice to shoulder 1-2 times daily, for 20 minutes at a time  PT for ROM and strengthening to shoulder, rotator cuff, scapular stabilizers  Naproxen 500mg BID x 14 days sent to pharmacy     Imaging: All imaging from today was reviewed by myself and explained to the patient  Injection:    Offered corticosteroid injection but declined at this time  Patient has fear of needles  If considering future corticosteroid injection, it will be performed under ultrasound guidance with spine and pain over the biceps tendon  Surgery:     No surgery is recommended at this point, continue with conservative treatment plan as noted  Follow up:    Return in about 6 weeks (around 9/17/2020) for Recheck  If no improvement consider ultrasound-guided biceps tendon injection  Chief Complaint   Patient presents with    Right Shoulder - Follow-up         History of Present Illness: The patient is returns for follow up of right shoulder MRI  Since the prior visit, He reports improvement since last appointment  Patient stated that his last appointment he had difficulty raising the arm due to pain  Patient states that he has full range of motion at this time  Patient still states that with certain mid-level shoulder motion performed out in front of him that he will get a clicking sensation in the anterior aspect of the shoulder the recalls a sharp pain  Pain is improved by rest, ice and NSAIDS  Pain is aggravated by shoulder motion performed in front of him  Symptoms include clicking  The patient denies weakness  The patient has tried rest, ice and NSAIDS          Shoulder Surgical History:  None    Past Medical, Social and Family History:  Past Medical History:   Diagnosis Date    Tachycardia     as a child     Past Surgical History:   Procedure Laterality Date    ELBOW SURGERY      TONSILECTOMY AND ADNOIDECTOMY      VASECTOMY  2016    WISDOM TOOTH EXTRACTION       No Known Allergies  Current Outpatient Medications on File Prior to Visit   Medication Sig Dispense Refill    Doxylamine Succinate, Sleep, (UNISOM PO) Take by mouth      MELATONIN PO Take by mouth      ibuprofen (MOTRIN) 400 mg tablet Take 1 tablet (400 mg total) by mouth every 6 (six) hours as needed for mild pain or fever (Patient not taking: Reported on 7/23/2020) 30 tablet 0    methocarbamol (ROBAXIN) 500 mg tablet Take 1 tablet (500 mg total) by mouth daily at bedtime as needed for muscle spasms (Patient not taking: Reported on 7/23/2020) 15 tablet 0     No current facility-administered medications on file prior to visit        Social History     Socioeconomic History    Marital status: /Civil Union     Spouse name: Riri Rodriguez Number of children: 2    Years of education: Not on file    Highest education level: Not on file   Occupational History    Occupation:    Social Needs    Financial resource strain: Not on file    Food insecurity     Worry: Not on file     Inability: Not on file   [employer] needs     Medical: Not on file     Non-medical: Not on file   Tobacco Use    Smoking status: Never Smoker    Smokeless tobacco: Never Used   Substance and Sexual Activity    Alcohol use: Yes     Comment: social    Drug use: Never    Sexual activity: Not on file   Lifestyle    Physical activity     Days per week: 0 days     Minutes per session: 0 min    Stress: Not on file   Relationships    Social connections     Talks on phone: Not on file     Gets together: Not on file     Attends Moravian service: Not on file     Active member of club or organization: Not on file     Attends meetings of clubs or organizations: Not on file     Relationship status: Not on file    Intimate partner violence     Fear of current or ex partner: Not on file     Emotionally abused: Not on file     Physically abused: Not on file     Forced sexual activity: Not on file   Other Topics Concern    Not on file   Social History Narrative    Who lives in your home:wife and 2 children    What type of home do you live in: Single house    Age of your home: 18 months    How long have you been living there: 18 months    Type of heat: Forced hot air    Type of fuel: Gas and Electric    What type of ann is in your bedroom: Carpet    Do you have the following in or near your home:    Air products: Central air    Pests: none    Pets: 1 Cat    Are pets allowed in bedroom: Yes, rarely    Open fields, wooded areas nearby: N/A    Basement: None    Exposure to second hand smoke: Yes at work        Habits:    Caffeine: Current; Amount: 2 cups/day Red Bull, #years 12+    Chocolate: none    Other:       I have reviewed the past medical, surgical, social and family history, medications and allergies as documented in the EMR  Review of systems: ROS is negative other than that noted in the HPI  Constitutional: Negative for fatigue and fever  Physical Exam:    Blood pressure 104/71, pulse 81, temperature 99 2 °F (37 3 °C), height 6' 2" (1 88 m), weight 72 6 kg (160 lb)      General/Constitutional: NAD, well developed, well nourished  HENT: Normocephalic, atraumatic  CV: Intact distal pulses, regular rate  Resp: No respiratory distress or labored breathing  Lymphatic: No lymphadenopathy palpated  Neuro: Alert and Oriented x 3, no focal deficits  Psych: Normal mood, normal affect, normal judgement, normal behavior  Skin: Warm, dry, no rashes, no erythema      Shoulder focused exam:       RIGHT LEFT    Scapula Atrophy Negative Negative     Winging Negative Negative     Protraction Negative Negative    Rotator cuff SS 5/5 without pain 5/5     IS 5/5 without pain 5/5     SubS 5/5 without pain 5/5    ROM     170     ER0 60 60                   IRb T6    T6    TTP: AC Negative Negative     Biceps Positive Negative     Coracoid Negative Negative    Special Tests: O'Briens Positive Negative     Ruggiero-shear Negative Negative     Cross body Adduction Negative Negative     Speeds  Negative Negative     Peter's Negative Negative     Whipple Negative Negative       Neer Negative Negative     Dixon Negative Negative    Instability: Apprehension & relocation not tested not tested     Load & shift not tested not tested    Other: Crank Negative Negative               UE NV Exam: +2 Radial pulses bilaterally  Sensation intact to light touch C5-T1 bilaterally, Radial/median/ulnar nerve motor intact    Cervical ROM is full without pain, numbness or tingling      Shoulder Imaging    MRI of the right shoulder was reviewed and demonstrates rotator cuff interstitial tendinitis without rotator cuff tear visualized  There is no labral tear visualized as well  I have reviewed the radiologist's report agree with their impression

## 2020-09-10 ENCOUNTER — HOSPITAL ENCOUNTER (EMERGENCY)
Facility: HOSPITAL | Age: 35
Discharge: HOME | End: 2020-09-11
Attending: EMERGENCY MEDICINE | Admitting: EMERGENCY MEDICINE
Payer: COMMERCIAL

## 2020-09-10 ENCOUNTER — APPOINTMENT (EMERGENCY)
Dept: CT IMAGING | Facility: HOSPITAL | Age: 35
End: 2020-09-10
Payer: COMMERCIAL

## 2020-09-10 ENCOUNTER — OFFICE VISIT (OUTPATIENT)
Dept: URGENT CARE | Age: 35
End: 2020-09-10
Payer: COMMERCIAL

## 2020-09-10 VITALS
BODY MASS INDEX: 20.53 KG/M2 | OXYGEN SATURATION: 100 % | TEMPERATURE: 98 F | RESPIRATION RATE: 20 BRPM | SYSTOLIC BLOOD PRESSURE: 114 MMHG | WEIGHT: 160 LBS | HEIGHT: 74 IN | HEART RATE: 70 BPM | DIASTOLIC BLOOD PRESSURE: 68 MMHG

## 2020-09-10 DIAGNOSIS — R07.81 RIB PAIN: Primary | ICD-10-CM

## 2020-09-10 DIAGNOSIS — R10.9 ABDOMINAL PAIN: Primary | ICD-10-CM

## 2020-09-10 DIAGNOSIS — R10.9 ABDOMINAL PAIN, UNSPECIFIED ABDOMINAL LOCATION: ICD-10-CM

## 2020-09-10 DIAGNOSIS — R11.0 NAUSEA: ICD-10-CM

## 2020-09-10 DIAGNOSIS — M54.9 BACK PAIN: ICD-10-CM

## 2020-09-10 LAB
ALBUMIN SERPL BCP-MCNC: 4.1 G/DL (ref 3.5–5)
ALP SERPL-CCNC: 63 U/L (ref 46–116)
ALT SERPL W P-5'-P-CCNC: 22 U/L (ref 12–78)
ANION GAP SERPL CALCULATED.3IONS-SCNC: 4 MMOL/L (ref 4–13)
AST SERPL W P-5'-P-CCNC: 21 U/L (ref 5–45)
BASOPHILS # BLD AUTO: 0.05 THOUSANDS/ΜL (ref 0–0.1)
BASOPHILS NFR BLD AUTO: 1 % (ref 0–1)
BILIRUB SERPL-MCNC: 1.59 MG/DL (ref 0.2–1)
BILIRUB UR QL STRIP: NEGATIVE
BUN SERPL-MCNC: 8 MG/DL (ref 5–25)
CALCIUM SERPL-MCNC: 9 MG/DL (ref 8.3–10.1)
CHLORIDE SERPL-SCNC: 104 MMOL/L (ref 100–108)
CLARITY UR: CLEAR
CLARITY, POC: CLEAR
CO2 SERPL-SCNC: 30 MMOL/L (ref 21–32)
COLOR UR: YELLOW
COLOR, POC: YELLOW
CREAT SERPL-MCNC: 0.76 MG/DL (ref 0.6–1.3)
EOSINOPHIL # BLD AUTO: 0.15 THOUSAND/ΜL (ref 0–0.61)
EOSINOPHIL NFR BLD AUTO: 2 % (ref 0–6)
ERYTHROCYTE [DISTWIDTH] IN BLOOD BY AUTOMATED COUNT: 12 % (ref 11.6–15.1)
GFR SERPL CREATININE-BSD FRML MDRD: 118 ML/MIN/1.73SQ M
GLUCOSE SERPL-MCNC: 93 MG/DL (ref 65–140)
GLUCOSE UR STRIP-MCNC: NEGATIVE MG/DL
HCT VFR BLD AUTO: 46.2 % (ref 36.5–49.3)
HGB BLD-MCNC: 15.6 G/DL (ref 12–17)
HGB UR QL STRIP.AUTO: NEGATIVE
IMM GRANULOCYTES # BLD AUTO: 0.02 THOUSAND/UL (ref 0–0.2)
IMM GRANULOCYTES NFR BLD AUTO: 0 % (ref 0–2)
KETONES UR STRIP-MCNC: NEGATIVE MG/DL
LEUKOCYTE ESTERASE UR QL STRIP: NEGATIVE
LIPASE SERPL-CCNC: 134 U/L (ref 73–393)
LYMPHOCYTES # BLD AUTO: 2.27 THOUSANDS/ΜL (ref 0.6–4.47)
LYMPHOCYTES NFR BLD AUTO: 24 % (ref 14–44)
MCH RBC QN AUTO: 29.5 PG (ref 26.8–34.3)
MCHC RBC AUTO-ENTMCNC: 33.8 G/DL (ref 31.4–37.4)
MCV RBC AUTO: 87 FL (ref 82–98)
MONOCYTES # BLD AUTO: 0.71 THOUSAND/ΜL (ref 0.17–1.22)
MONOCYTES NFR BLD AUTO: 8 % (ref 4–12)
NEUTROPHILS # BLD AUTO: 6.21 THOUSANDS/ΜL (ref 1.85–7.62)
NEUTS SEG NFR BLD AUTO: 65 % (ref 43–75)
NITRITE UR QL STRIP: NEGATIVE
NRBC BLD AUTO-RTO: 0 /100 WBCS
PH UR STRIP.AUTO: 6.5 [PH] (ref 4.5–8)
PLATELET # BLD AUTO: 160 THOUSANDS/UL (ref 149–390)
PMV BLD AUTO: 11.5 FL (ref 8.9–12.7)
POTASSIUM SERPL-SCNC: 3.7 MMOL/L (ref 3.5–5.3)
PROT SERPL-MCNC: 7.8 G/DL (ref 6.4–8.2)
PROT UR STRIP-MCNC: NEGATIVE MG/DL
RBC # BLD AUTO: 5.29 MILLION/UL (ref 3.88–5.62)
SODIUM SERPL-SCNC: 138 MMOL/L (ref 136–145)
SP GR UR STRIP.AUTO: 1.02 (ref 1–1.03)
UROBILINOGEN UR QL STRIP.AUTO: 0.2 E.U./DL
WBC # BLD AUTO: 9.41 THOUSAND/UL (ref 4.31–10.16)

## 2020-09-10 PROCEDURE — 99285 EMERGENCY DEPT VISIT HI MDM: CPT | Performed by: PHYSICIAN ASSISTANT

## 2020-09-10 PROCEDURE — 81002 URINALYSIS NONAUTO W/O SCOPE: CPT | Performed by: PHYSICIAN ASSISTANT

## 2020-09-10 PROCEDURE — 83690 ASSAY OF LIPASE: CPT | Performed by: PHYSICIAN ASSISTANT

## 2020-09-10 PROCEDURE — G0382 LEV 3 HOSP TYPE B ED VISIT: HCPCS | Performed by: PHYSICIAN ASSISTANT

## 2020-09-10 PROCEDURE — 96374 THER/PROPH/DIAG INJ IV PUSH: CPT

## 2020-09-10 PROCEDURE — 96361 HYDRATE IV INFUSION ADD-ON: CPT

## 2020-09-10 PROCEDURE — 85025 COMPLETE CBC W/AUTO DIFF WBC: CPT | Performed by: PHYSICIAN ASSISTANT

## 2020-09-10 PROCEDURE — G1004 CDSM NDSC: HCPCS

## 2020-09-10 PROCEDURE — 96375 TX/PRO/DX INJ NEW DRUG ADDON: CPT

## 2020-09-10 PROCEDURE — 80053 COMPREHEN METABOLIC PANEL: CPT | Performed by: PHYSICIAN ASSISTANT

## 2020-09-10 PROCEDURE — 99284 EMERGENCY DEPT VISIT MOD MDM: CPT

## 2020-09-10 PROCEDURE — 81003 URINALYSIS AUTO W/O SCOPE: CPT

## 2020-09-10 PROCEDURE — 36415 COLL VENOUS BLD VENIPUNCTURE: CPT | Performed by: PHYSICIAN ASSISTANT

## 2020-09-10 PROCEDURE — 74177 CT ABD & PELVIS W/CONTRAST: CPT

## 2020-09-10 RX ORDER — MORPHINE SULFATE 4 MG/ML
4 INJECTION, SOLUTION INTRAMUSCULAR; INTRAVENOUS ONCE
Status: COMPLETED | OUTPATIENT
Start: 2020-09-10 | End: 2020-09-10

## 2020-09-10 RX ORDER — ONDANSETRON 2 MG/ML
4 INJECTION INTRAMUSCULAR; INTRAVENOUS ONCE
Status: COMPLETED | OUTPATIENT
Start: 2020-09-10 | End: 2020-09-10

## 2020-09-10 RX ADMIN — IOHEXOL 100 ML: 350 INJECTION, SOLUTION INTRAVENOUS at 22:54

## 2020-09-10 RX ADMIN — ONDANSETRON 4 MG: 2 INJECTION INTRAMUSCULAR; INTRAVENOUS at 21:39

## 2020-09-10 RX ADMIN — SODIUM CHLORIDE 1000 ML: 0.9 INJECTION, SOLUTION INTRAVENOUS at 21:39

## 2020-09-10 RX ADMIN — MORPHINE SULFATE 4 MG: 4 INJECTION INTRAVENOUS at 21:41

## 2020-09-10 NOTE — PATIENT INSTRUCTIONS
Chest Wall Pain   WHAT YOU NEED TO KNOW:   Chest wall pain may be caused by problems with the muscles, cartilage, or bones of the chest wall  Chest wall pain may also be caused by pain that spreads to your chest from another part of your body  The pain may be aching, severe, dull, or sharp  It may come and go, or it may be constant  The pain may be worse when you move in certain ways, breathe deeply, or cough  DISCHARGE INSTRUCTIONS:   Call 911 if:   · You have any of the following signs of a heart attack:      ¨ Squeezing, pressure, or pain in your chest that lasts longer than 5 minutes or returns    ¨ Discomfort or pain in your back, neck, jaw, stomach, or arm     ¨ Trouble breathing    ¨ Nausea or vomiting    ¨ Lightheadedness or a sudden cold sweat, especially with chest pain or trouble breathing    Return to the emergency department if:   · You have severe pain  Contact your healthcare provider if:   · You develop a rash  · You have other new symptoms  · Your pain does not improve, even with treatment  · You have questions or concerns about your condition or care  Medicines: You may need any of the following:  · NSAIDs , such as ibuprofen, help decrease swelling, pain, and fever  This medicine is available with or without a doctor's order  NSAIDs can cause stomach bleeding or kidney problems in certain people  If you take blood thinner medicine, always ask your healthcare provider if NSAIDs are safe for you  Always read the medicine label and follow directions  · Acetaminophen  decreases pain  It is available without a doctor's order  Ask how much to take and how often to take it  Follow directions  Acetaminophen can cause liver damage if not taken correctly  · A cream  may be applied to your chest to decrease pain  · Take your medicine as directed  Contact your healthcare provider if you think your medicine is not helping or if you have side effects   Tell him of her if you are allergic to any medicine  Keep a list of the medicines, vitamins, and herbs you take  Include the amounts, and when and why you take them  Bring the list or the pill bottles to follow-up visits  Carry your medicine list with you in case of an emergency  Follow up with your healthcare provider as directed:  Write down your questions so you remember to ask them during your visits  Self-care:   · Rest  as needed  Avoid activities that make your chest wall pain worse  · Apply heat  on your chest for 20 to 30 minutes every 2 hours for as many days as directed  Heat helps decrease pain and muscle spasms  · Apply ice  on your chest for 15 to 20 minutes every hour or as directed  Use an ice pack, or put crushed ice in a plastic bag  Cover it with a towel  Ice helps prevent tissue damage and decreases swelling and pain  © 2017 [employer] Information is for End User's use only and may not be sold, redistributed or otherwise used for commercial purposes  All illustrations and images included in CareNotes® are the copyrighted property of [employer]  or [employer]  The above information is an  only  It is not intended as medical advice for individual conditions or treatments  Talk to your doctor, nurse or pharmacist before following any medical regimen to see if it is safe and effective for you  Acute Abdominal Pain   WHAT YOU NEED TO KNOW:   The cause of your abdominal pain may not be found  If a cause is found, treatment will depend on what the cause is  DISCHARGE INSTRUCTIONS:   Return to the emergency department if:   · You vomit blood or cannot stop vomiting  · You have blood in your bowel movement or it looks like tar  · You have bleeding from your rectum  · Your abdomen is larger than usual, more painful, and hard  · You have severe pain in your abdomen  · You stop passing gas and having bowel movements       · You feel weak, dizzy, or faint  Contact your healthcare provider if:   · You have a fever  · You have new signs and symptoms  · Your symptoms do not get better with treatment  · You have questions or concerns about your condition or care  Medicines  may be given to decrease pain, treat an infection, and manage your symptoms  Take your medicine as directed  Call your healthcare provider if you think your medicine is not helping or if you have side effects  Tell him if you are allergic to any medicine  Keep a list of the medicines, vitamins, and herbs you take  Include the amounts, and when and why you take them  Bring the list or the pill bottles to follow-up visits  Carry your medicine list with you in case of an emergency  Manage your symptoms:   · Apply heat  on your abdomen for 20 to 30 minutes every 2 hours for as many days as directed  Heat helps decrease pain and muscle spasms  · Manage your stress  Stress may cause abdominal pain  Your healthcare provider may recommend relaxation techniques and deep breathing exercises to help decrease your stress  Your healthcare provider may recommend you talk to someone about your stress or anxiety, such as a counselor or a trusted friend  Get plenty of sleep and exercise regularly  · Limit or do not drink alcohol  Alcohol can make your abdominal pain worse  Ask your healthcare provider if it is safe for you to drink alcohol  Also ask how much is safe for you to drink  · Do not smoke  Nicotine and other chemicals in cigarettes can damage your esophagus and stomach  Ask your healthcare provider for information if you currently smoke and need help to quit  E-cigarettes or smokeless tobacco still contain nicotine  Talk to your healthcare provider before you use these products  Make changes to the food you eat as directed:  Do not eat foods that cause abdominal pain or other symptoms  Eat small meals more often  · Eat more high-fiber foods if you are constipated  High-fiber foods include fruits, vegetables, whole-grain foods, and legumes  · Do not eat foods that cause gas if you have bloating  Examples include broccoli, cabbage, and cauliflower  Do not drink soda or carbonated drinks, because these may also cause gas  · Do not eat foods or drinks that contain sorbitol or fructose if you have diarrhea and bloating  Some examples are fruit juices, candy, jelly, and sugar-free gum  · Do not eat high-fat foods, such as fried foods, cheeseburgers, hot dogs, and desserts  · Limit or do not drink caffeine  Caffeine may make symptoms, such as heart burn or nausea, worse  · Drink plenty of liquids to prevent dehydration from diarrhea or vomiting  Ask your healthcare provider how much liquid to drink each day and which liquids are best for you  Follow up with your healthcare provider as directed:  Write down your questions so you remember to ask them during your visits  © 2017 [address] Information is for End User's use only and may not be sold, redistributed or otherwise used for commercial purposes  All illustrations and images included in CareNotes® are the copyrighted property of [employer]  or [employer]  The above information is an  only  It is not intended as medical advice for individual conditions or treatments  Talk to your doctor, nurse or pharmacist before following any medical regimen to see if it is safe and effective for you

## 2020-09-10 NOTE — PROGRESS NOTES
[facility]        NAME: Tamara Hood is a 27 y o  male  : 1985    MRN: [id number]  DATE: September 10, 2020  TIME: 7:29 PM    /68   Pulse 70   Temp 98 °F (36 7 °C)   Resp 20   Ht 6' 2" (1 88 m)   Wt 72 6 kg (160 lb)   SpO2 100%   BMI 20 54 kg/m²     Assessment and Plan   Rib pain [R07 81]  1  Rib pain     2  Abdominal pain, unspecified abdominal location           Patient Instructions       Follow up with PCP in 3-5 days  Proceed to  ER if symptoms worsen  Chief Complaint     Chief Complaint   Patient presents with    Back Pain     pt is having back pain last 3 days  pt states this time it is hurting his rib cage  pt states it hurts when he takes a deep breath  denies any fever  does have a sick to stomach feeling  History of Present Illness       Pt with left rib pain and left upper quadrant pain starting yesterday worst pain ever with nausea no vomiting pt has scoliosis  And has had left lower rib pain before but never this and never with abdomen pain     Drug Problem         Review of Systems   Review of Systems   Constitutional: Negative  HENT: Negative  Eyes: Negative  Respiratory: Negative  Cardiovascular: Negative  Left lower rib pain    Gastrointestinal: Positive for abdominal pain  Endocrine: Negative  Genitourinary: Negative  Musculoskeletal: Negative  Skin: Negative  Allergic/Immunologic: Negative  Neurological: Negative  Hematological: Negative  Psychiatric/Behavioral: Negative  All other systems reviewed and are negative          Current Medications       Current Outpatient Medications:     Doxylamine Succinate, Sleep, (UNISOM PO), Take by mouth, Disp: , Rfl:     MELATONIN PO, Take by mouth, Disp: , Rfl:     ibuprofen (MOTRIN) 400 mg tablet, Take 1 tablet (400 mg total) by mouth every 6 (six) hours as needed for mild pain or fever (Patient not taking: Reported on 2020), Disp: 30 tablet, Rfl: 0   methocarbamol (ROBAXIN) 500 mg tablet, Take 1 tablet (500 mg total) by mouth daily at bedtime as needed for muscle spasms (Patient not taking: Reported on 7/23/2020), Disp: 15 tablet, Rfl: 0    naproxen (NAPROSYN) 500 mg tablet, Take 1 tablet (500 mg total) by mouth 2 (two) times a day with meals for 14 days, Disp: 28 tablet, Rfl: 0    Current Allergies     Allergies as of 09/10/2020    (No Known Allergies)            The following portions of the patient's history were reviewed and updated as appropriate: allergies, current medications, past family history, past medical history, past social history, past surgical history and problem list      Past Medical History:   Diagnosis Date    Tachycardia     as a child       Past Surgical History:   Procedure Laterality Date    ELBOW SURGERY      TONSILECTOMY AND ADNOIDECTOMY      VASECTOMY  2016    WISDOM TOOTH EXTRACTION         Family History   Problem Relation Age of Onset    No Known Problems Mother     Cancer Father     Depression Sister     Fibromyalgia Sister     Mitral valve prolapse Sister     No Known Problems Son     No Known Problems Daughter          Medications have been verified  Objective   /68   Pulse 70   Temp 98 °F (36 7 °C)   Resp 20   Ht 6' 2" (1 88 m)   Wt 72 6 kg (160 lb)   SpO2 100%   BMI 20 54 kg/m²        Physical Exam     Physical Exam  Vitals signs and nursing note reviewed  Constitutional:       Appearance: Normal appearance  Comments: Pt severity of pain and radiating into left mid back  Will go to er for blood eval and possible ct scan    HENT:      Head: Normocephalic  Right Ear: Tympanic membrane, ear canal and external ear normal       Left Ear: Tympanic membrane, ear canal and external ear normal       Nose: Nose normal       Mouth/Throat:      Mouth: Mucous membranes are moist       Pharynx: Oropharynx is clear  Eyes:      Extraocular Movements: Extraocular movements intact        Pupils: Pupils are equal, round, and reactive to light  Neck:      Musculoskeletal: Normal range of motion and neck supple  Cardiovascular:      Rate and Rhythm: Normal rate and regular rhythm  Pulses: Normal pulses  Heart sounds: Normal heart sounds  Pulmonary:      Effort: Pulmonary effort is normal       Breath sounds: Normal breath sounds  Abdominal:      General: Abdomen is flat  Bowel sounds are normal       Palpations: Abdomen is soft  Comments: luq and midepigastric pain  8out of 10  Radiating into left back stabbing ice pick type pain    Musculoskeletal: Normal range of motion  Skin:     General: Skin is warm  Capillary Refill: Capillary refill takes less than 2 seconds  Neurological:      General: No focal deficit present  Mental Status: He is alert and oriented to person, place, and time     Psychiatric:         Mood and Affect: Mood normal          Behavior: Behavior normal

## 2020-09-11 VITALS
HEART RATE: 84 BPM | SYSTOLIC BLOOD PRESSURE: 143 MMHG | OXYGEN SATURATION: 98 % | TEMPERATURE: 99.1 F | DIASTOLIC BLOOD PRESSURE: 81 MMHG | BODY MASS INDEX: 19.87 KG/M2 | RESPIRATION RATE: 17 BRPM | WEIGHT: 154.76 LBS

## 2020-09-11 RX ORDER — ONDANSETRON 4 MG/1
4 TABLET, ORALLY DISINTEGRATING ORAL EVERY 6 HOURS PRN
Qty: 20 TABLET | Refills: 0 | Status: SHIPPED | OUTPATIENT
Start: 2020-09-11 | End: 2021-08-09 | Stop reason: ALTCHOICE

## 2020-09-11 RX ORDER — DICYCLOMINE HCL 20 MG
20 TABLET ORAL 2 TIMES DAILY
Qty: 20 TABLET | Refills: 0 | Status: SHIPPED | OUTPATIENT
Start: 2020-09-11 | End: 2021-08-09 | Stop reason: ALTCHOICE

## 2020-09-11 NOTE — ED PROVIDER NOTES
History  Chief Complaint   Patient presents with    Abdominal Pain     Pt states "really bad pain in my left side of stomach that goes to back  started this morning  nausea all day"     Patient is a 59-year-old male with no significant past medical history, presents emergency department for evaluation of abdominal pain  Patient states he began with left-sided back pain 3 days ago, patient states he does have history of scoliosis  Patient states he woke up this morning and developed left upper abdominal pain with associated nausea  Patient states pain has been constant localized to left upper quadrant with intermittent episodes of sharp pain  Patient states he went to urgent care and was referred to emergency department for further evaluation of left-sided abdominal pain  Patient denies fever, chills, injury/trauma, recent viral URI, vomiting, diarrhea, constipation, dysuria, hematuria, rash  Patient states last bowel movement was this morning and was normal           Prior to Admission Medications   Prescriptions Last Dose Informant Patient Reported? Taking? Doxylamine Succinate, Sleep, (UNISOM PO)   Yes No   Sig: Take by mouth   MELATONIN PO   Yes No   Sig: Take by mouth   naproxen (NAPROSYN) 500 mg tablet   No No   Sig: Take 1 tablet (500 mg total) by mouth 2 (two) times a day with meals for 14 days      Facility-Administered Medications: None       Past Medical History:   Diagnosis Date    Tachycardia     as a child       Past Surgical History:   Procedure Laterality Date    ELBOW SURGERY      TONSILECTOMY AND ADNOIDECTOMY      VASECTOMY  2016    WISDOM TOOTH EXTRACTION         Family History   Problem Relation Age of Onset    No Known Problems Mother     Cancer Father     Depression Sister     Fibromyalgia Sister     Mitral valve prolapse Sister     No Known Problems Son     No Known Problems Daughter      I have reviewed and agree with the history as documented      E-Cigarette/Vaping    E-Cigarette Use Never User      E-Cigarette/Vaping Substances    Nicotine No     THC No     CBD No     Flavoring No     Other No     Unknown No      Social History     Tobacco Use    Smoking status: Never Smoker    Smokeless tobacco: Never Used   Substance Use Topics    Alcohol use: Yes     Comment: social    Drug use: Never       Review of Systems   Constitutional: Negative for chills and fever  HENT: Negative for ear pain and sore throat  Eyes: Negative for redness  Respiratory: Negative for chest tightness and shortness of breath  Cardiovascular: Negative for chest pain  Gastrointestinal: Positive for abdominal pain and nausea  Negative for abdominal distention, constipation, diarrhea and vomiting  Musculoskeletal: Positive for back pain  Negative for neck pain  Skin: Negative for rash  Neurological: Negative for speech difficulty and weakness  Psychiatric/Behavioral: Negative for confusion  Physical Exam  Physical Exam  Constitutional:       General: He is not in acute distress  Appearance: He is well-developed  He is not ill-appearing, toxic-appearing or diaphoretic  HENT:      Head: Normocephalic and atraumatic  Right Ear: External ear normal       Left Ear: External ear normal       Nose: Nose normal       Mouth/Throat:      Mouth: Mucous membranes are moist       Pharynx: Oropharynx is clear  Uvula midline  Neck:      Musculoskeletal: Normal range of motion and neck supple  Cardiovascular:      Rate and Rhythm: Normal rate and regular rhythm  Pulmonary:      Effort: Pulmonary effort is normal       Breath sounds: Normal breath sounds  No decreased breath sounds, wheezing, rhonchi or rales  Abdominal:      General: Abdomen is flat  Bowel sounds are normal  There is no distension  Palpations: Abdomen is soft  There is no mass  Tenderness: There is abdominal tenderness in the periumbilical area and left upper quadrant  There is guarding   There is no right CVA tenderness, left CVA tenderness or rebound  Negative signs include Renteria's sign, Rovsing's sign, McBurney's sign, psoas sign and obturator sign  Skin:     General: Skin is warm  Capillary Refill: Capillary refill takes less than 2 seconds  Coloration: Skin is not jaundiced or pale  Findings: No rash  Neurological:      Mental Status: He is alert and oriented to person, place, and time     Psychiatric:         Mood and Affect: Mood and affect normal          Speech: Speech normal          Behavior: Behavior normal          Vital Signs  ED Triage Vitals   Temperature Pulse Respirations Blood Pressure SpO2   09/10/20 1948 09/10/20 1948 09/10/20 1948 09/10/20 1948 09/10/20 1948   99 1 °F (37 3 °C) 82 18 115/74 99 %      Temp src Heart Rate Source Patient Position - Orthostatic VS BP Location FiO2 (%)   -- 09/10/20 2153 09/10/20 2153 09/10/20 2153 --    Monitor Lying Right arm       Pain Score       09/10/20 1948       Worst Possible Pain           Vitals:    09/10/20 1948 09/10/20 2153 09/11/20 0010   BP: 115/74 119/85 143/81   Pulse: 82 75 84   Patient Position - Orthostatic VS:  Lying Sitting         Visual Acuity      ED Medications  Medications   sodium chloride 0 9 % bolus 1,000 mL (0 mL Intravenous Stopped 9/10/20 2328)   ondansetron (ZOFRAN) injection 4 mg (4 mg Intravenous Given 9/10/20 2139)   morphine (PF) 4 mg/mL injection 4 mg (4 mg Intravenous Given 9/10/20 2141)   iohexol (OMNIPAQUE) 350 MG/ML injection (SINGLE-DOSE) 100 mL (100 mL Intravenous Given 9/10/20 2254)       Diagnostic Studies  Results Reviewed     Procedure Component Value Units Date/Time    Comprehensive metabolic panel [[id number]]  (Abnormal) Collected:  09/10/20 2137    Lab Status:  Final result Specimen:  Blood from Arm, Left Updated:  09/10/20 2213     Sodium 138 mmol/L      Potassium 3 7 mmol/L      Chloride 104 mmol/L      CO2 30 mmol/L      ANION GAP 4 mmol/L      BUN 8 mg/dL      Creatinine 0 76 mg/dL Glucose 93 mg/dL      Calcium 9 0 mg/dL      AST 21 U/L      ALT 22 U/L      Alkaline Phosphatase 63 U/L      Total Protein 7 8 g/dL      Albumin 4 1 g/dL      Total Bilirubin 1 59 mg/dL      eGFR 118 ml/min/1 73sq m     Narrative:       [place] guidelines for Chronic Kidney Disease (CKD):     Stage 1 with normal or high GFR (GFR > 90 mL/min/1 73 square meters)    Stage 2 Mild CKD (GFR = 60-89 mL/min/1 73 square meters)    Stage 3A Moderate CKD (GFR = 45-59 mL/min/1 73 square meters)    Stage 3B Moderate CKD (GFR = 30-44 mL/min/1 73 square meters)    Stage 4 Severe CKD (GFR = 15-29 mL/min/1 73 square meters)    Stage 5 End Stage CKD (GFR <15 mL/min/1 73 square meters)  Note: GFR calculation is accurate only with a steady state creatinine    Lipase [[id number]]  (Normal) Collected:  09/10/20 2137    Lab Status:  Final result Specimen:  Blood from Arm, Left Updated:  09/10/20 2213     Lipase 134 u/L     CBC and differential [[id number]] Collected:  09/10/20 2137    Lab Status:  Final result Specimen:  Blood from Arm, Left Updated:  09/10/20 2155     WBC 9 41 Thousand/uL      RBC 5 29 Million/uL      Hemoglobin 15 6 g/dL      Hematocrit 46 2 %      MCV 87 fL      MCH 29 5 pg      MCHC 33 8 g/dL      RDW 12 0 %      MPV 11 5 fL      Platelets 941 Thousands/uL      nRBC 0 /100 WBCs      Neutrophils Relative 65 %      Immat GRANS % 0 %      Lymphocytes Relative 24 %      Monocytes Relative 8 %      Eosinophils Relative 2 %      Basophils Relative 1 %      Neutrophils Absolute 6 21 Thousands/µL      Immature Grans Absolute 0 02 Thousand/uL      Lymphocytes Absolute 2 27 Thousands/µL      Monocytes Absolute 0 71 Thousand/µL      Eosinophils Absolute 0 15 Thousand/µL      Basophils Absolute 0 05 Thousands/µL     POCT urinalysis dipstick [[id number]]  (Normal) Resulted:  09/10/20 2148    Lab Status:  Edited Result - FINAL Specimen:  Urine Updated:  09/10/20 2148     Color, UA yellow Clarity, UA clear    Urine Macroscopic, POC [[id number]] Collected:  09/10/20 2142    Lab Status:  Final result Specimen:  Urine Updated:  09/10/20 2143     Color, UA Yellow     Clarity, UA Clear     pH, UA 6 5     Leukocytes, UA Negative     Nitrite, UA Negative     Protein, UA Negative mg/dl      Glucose, UA Negative mg/dl      Ketones, UA Negative mg/dl      Urobilinogen, UA 0 2 E U /dl      Bilirubin, UA Negative     Blood, UA Negative     Specific Gravity, UA 1 025    Narrative:       [facility] RESULT                 CT abdomen pelvis with contrast   Final Result by Javier Chung DO (09/10 2335)      No acute findings            Workstation performed: [id number]                    Procedures  Procedures         ED Course                           SBIRT 22yo+      Most Recent Value   SBIRT (23 yo +)   In order to provide better care to our patients, we are screening all of our patients for alcohol and drug use  Would it be okay to ask you these screening questions? No Filed at: 09/10/2020 2111                  MDM  Number of Diagnoses or Management Options  Abdominal pain: new and does not require workup  Back pain: new and does not require workup  Nausea: new and does not require workup  Diagnosis management comments: Patient is a 41-year-old male with no significant past medical history, presents emergency department for evaluation of abdominal pain  Patient states he began with left-sided back pain 3 days ago, patient states he does have history of scoliosis  Patient states he woke up this morning and developed left upper abdominal pain with associated nausea  Patient states pain has been constant localized to left upper quadrant with intermittent episodes of sharp pain  Patient states he went to urgent care and was referred to emergency department for further evaluation of left-sided abdominal pain       UA negative for infection or blood  Lab work unremarkable  CT abdomen/pelvis shows no acute intra-abdominal abnormality  Morphine, Zofran and IV fluids given in emergency department with improvement in symptoms  Suspect possible gastritis versus passed kidney stone versus musculoskeletal pain  Rx for Bentyl and Zofran provided patient for symptomatic relief  Encouraged patient to follow-up with PCP/gastroenterology for further evaluation of symptoms not improved  Reasons to return emergency department discussed with patient  Patient verbalizes understanding and agrees with plan  The management plan was discussed in detail with the patient at bedside and all questions were answered  Prior to discharge, I provided both verbal and written instructions  I discussed with the patient the signs and symptoms for which to return to the emergency department  All questions were answered and patient was comfortable with the plan of care and discharged to home  The patient agrees to return to the Emergency Department for concerns and/or progression of illness  Disposition  Final diagnoses:   Abdominal pain   Nausea   Back pain     Time reflects when diagnosis was documented in both MDM as applicable and the Disposition within this note     Time User Action Codes Description Comment    9/11/2020 12:00 AM Coretha Artie Add [R10 9] Abdominal pain     9/11/2020 12:00 AM Coretha Artie Add [R11 0] Nausea     9/11/2020 12:01 AM Coretha Artie Add [M54 9] Back pain       ED Disposition     ED Disposition Condition Date/Time Comment    Discharge Stable Fri Sep 11, 2020 12:00 AM Murtaza Baltazar discharge to home/self care              Follow-up Information     Follow up With Specialties Details Why Contact Info Additional [address] [facility] Emergency Department Emergency Medicine Go to  If symptoms worsen [place] [address] [phone] [place] [facility], [address] [facility] [place]  , [place], [place], [address] SH[facility]   [address] [address]  Jesus Howe Ambrocio Haas [address] [facility] [place], [address] [place] [address], [address], [facility] [place], [address] [phone]          Discharge Medication List as of 9/11/2020 12:01 AM      START taking these medications    Details   dicyclomine (BENTYL) 20 mg tablet Take 1 tablet (20 mg total) by mouth 2 (two) times a day, Starting Fri 9/11/2020, Print      ondansetron (ZOFRAN-ODT) 4 mg disintegrating tablet Take 1 tablet (4 mg total) by mouth every 6 (six) hours as needed for nausea or vomiting, Starting Fri 9/11/2020, Print         CONTINUE these medications which have NOT CHANGED    Details   Doxylamine Succinate, Sleep, (UNISOM PO) Take by mouth, Historical Med      MELATONIN PO Take by mouth, Historical Med      naproxen (NAPROSYN) 500 mg tablet Take 1 tablet (500 mg total) by mouth 2 (two) times a day with meals for 14 days, Starting Thu 8/6/2020, Until Thu 8/20/2020, Normal           No discharge procedures on file      PDMP Review       Value Time User    PDMP Reviewed  Yes 9/10/2020 11:37 PM Roger Gamble PA-C          ED Provider  Electronically Signed by           Roger Gamble PA-C  09/11/20 9616

## 2021-08-09 ENCOUNTER — OFFICE VISIT (OUTPATIENT)
Dept: INTERNAL MEDICINE CLINIC | Facility: CLINIC | Age: 36
End: 2021-08-09
Payer: COMMERCIAL

## 2021-08-09 ENCOUNTER — TELEPHONE (OUTPATIENT)
Dept: INTERNAL MEDICINE CLINIC | Facility: CLINIC | Age: 36
End: 2021-08-09

## 2021-08-09 VITALS
HEART RATE: 90 BPM | SYSTOLIC BLOOD PRESSURE: 110 MMHG | HEIGHT: 74 IN | DIASTOLIC BLOOD PRESSURE: 74 MMHG | OXYGEN SATURATION: 99 % | BODY MASS INDEX: 21.3 KG/M2 | TEMPERATURE: 98 F | WEIGHT: 166 LBS

## 2021-08-09 DIAGNOSIS — Z11.4 SCREENING FOR HIV (HUMAN IMMUNODEFICIENCY VIRUS): ICD-10-CM

## 2021-08-09 DIAGNOSIS — Z76.89 ENCOUNTER TO ESTABLISH CARE: Primary | ICD-10-CM

## 2021-08-09 DIAGNOSIS — Z11.59 NEED FOR HEPATITIS C SCREENING TEST: ICD-10-CM

## 2021-08-09 DIAGNOSIS — G47.00 INSOMNIA, UNSPECIFIED TYPE: ICD-10-CM

## 2021-08-09 DIAGNOSIS — Z13.6 SCREENING FOR CARDIOVASCULAR CONDITION: ICD-10-CM

## 2021-08-09 PROCEDURE — 99203 OFFICE O/P NEW LOW 30 MIN: CPT | Performed by: NURSE PRACTITIONER

## 2021-08-09 NOTE — PROGRESS NOTES
Virtual Regular Visit    Verification of patient location:    Patient is located in the following state in which I hold an active license [place]      Assessment/Plan:    Problem List Items Addressed This Visit        Other    Insomnia, unspecified     Failed OTC therapies  Will get Home Sleep Study d/t the various factors that may be contributing to his insomnia  Will get CBC, CMP, TSH to assess for other etiologies of his insomnia  Keep sleep and symptom log for about 1 week to better help assess symptoms  Will consider Ambien at next visit pending lab results  F/u in 2 weeks or sooner as needed         Relevant Orders    Comprehensive metabolic panel    CBC and differential    TSH, 3rd generation with Free T4 reflex    Home Study      Other Visit Diagnoses     Encounter to establish care    -  Primary    Need for hepatitis C screening test        Relevant Orders    Hepatitis C Antibody ([facility])    Screening for HIV (human immunodeficiency virus)        Relevant Orders    HIV 1/2 Antigen/Antibody (4th Generation) w Reflex [facility]    Screening for cardiovascular condition        Relevant Orders    Lipid Panel with Direct LDL reflex               Reason for visit is   Chief Complaint   Patient presents with    Virtual Regular Visit     new patient, issues sleeping [phone] amwell        Encounter provider JONEL Farrar    Provider located at [address] [place] [phone]      Recent Visits  No visits were found meeting these conditions  Showing recent visits within past 7 days and meeting all other requirements  Today's Visits  Date Type Provider Dept   08/09/21 Office Visit JONEL Farrar [facility]   Showing today's visits and meeting all other requirements  Future Appointments  No visits were found meeting these conditions    Showing future appointments within next 150 days and meeting all other requirements       The patient was identified by name and date of birth  Pancho Patrick was informed that this is a telemedicine visit and that the visit is being conducted through [address] [place] [address] [facility] [employer] and patient was informed that this is a secure, HIPAA-compliant platform  He agrees to proceed     My office door was closed  No one else was in the room  He acknowledged consent and understanding of privacy and security of the video platform  The patient has agreed to participate and understands they can discontinue the visit at any time  Patient is aware this is a billable service  Joan Raymundo is a 38 y o  male    Patient presents as a new patient to establish care  Previous PCP was through [facility], Dr Uday Staples, last visit with this provider was 2016  Last labs were 9/10/20,  noted in Epic   Patient's past medical, allergy, medication, surgical, family, and social histories all reviewed at length  The patient sees the following specialists:   None      He reports that he can't sleep because he used to work as a   Now he works in heavy machinery, 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  He changed jobs 2 months ago  He used to take Ambien years ago, but he stopped taking it d/t his  career and risk to him with his various work schedules  He is drinking 3-4 Red Bulls per day  Because he doesn't sleep, he feels sick as a result  His diet is poor because of poor sleep, as well  His wife snores, which keeps him awake, he reports  He reports that he has not slept much this past weekend  He does not snore, he awakens unrefreshed          Past Medical History:   Diagnosis Date    Tachycardia     as a child       Past Surgical History:   Procedure Laterality Date    ELBOW SURGERY      TONSILECTOMY AND ADNOIDECTOMY      VASECTOMY  2016    WISDOM TOOTH EXTRACTION         Current Outpatient Medications Medication Sig Dispense Refill    Doxylamine Succinate, Sleep, (UNISOM PO) Take by mouth      MELATONIN PO Take by mouth       No current facility-administered medications for this visit  No Known Allergies    Review of Systems   Constitutional: Positive for fatigue  Negative for chills and fever  HENT: Negative for trouble swallowing  Respiratory: Negative for choking and shortness of breath  Cardiovascular: Negative for chest pain  Gastrointestinal: Positive for abdominal pain (some reflux symptoms per patient, controlled with OTC acid reducer)  Negative for diarrhea, nausea and vomiting  Genitourinary: Negative for difficulty urinating  Musculoskeletal: Negative for arthralgias and gait problem  Skin: Negative for rash  Neurological: Negative for dizziness, light-headedness and headaches  Psychiatric/Behavioral: Positive for sleep disturbance  Negative for agitation, decreased concentration, dysphoric mood, hallucinations, self-injury and suicidal ideas  The patient is not nervous/anxious  Video Exam    Vitals:    08/09/21 1414   BP: 110/74   BP Location: Left arm   Patient Position: Sitting   Cuff Size: Adult   Pulse: 90   Temp: 98 °F (36 7 °C)   SpO2: 99%   Weight: 75 3 kg (166 lb)   Height: 6' 2" (1 88 m)       Physical Exam  Constitutional:       General: He is not in acute distress  Appearance: He is well-developed  HENT:      Head: Normocephalic and atraumatic  Eyes:      General: No scleral icterus  Pulmonary:      Effort: Pulmonary effort is normal    Abdominal:      Palpations: Abdomen is soft  Musculoskeletal:      Cervical back: Normal range of motion  Skin:     Findings: No erythema  Neurological:      Mental Status: He is alert and oriented to person, place, and time     Psychiatric:         Behavior: Behavior normal           I spent 36 minutes with patient today in which greater than 50% of the time was spent in counseling/coordination of care regarding treatment options, diagnostic studies, prognosis    VIRTUAL VISIT Jay Jay James verbally agrees to participate in [employer]  Pt is aware that [employer] could be limited without vital signs or the ability to perform a full hands-on physical exam  Chau Partida understands he or the provider may request at any time to terminate the video visit and request the patient to seek care or treatment in person

## 2021-08-09 NOTE — ASSESSMENT & PLAN NOTE
Failed OTC therapies  Will get Home Sleep Study d/t the various factors that may be contributing to his insomnia  Will get CBC, CMP, TSH to assess for other etiologies of his insomnia  Keep sleep and symptom log for about 1 week to better help assess symptoms  Will consider Ambien at next visit pending lab results     F/u in 2 weeks or sooner as needed

## 2024-03-05 ENCOUNTER — APPOINTMENT (OUTPATIENT)
Dept: URGENT CARE | Age: 39
End: 2024-03-05